# Patient Record
Sex: MALE | Race: WHITE | URBAN - METROPOLITAN AREA
[De-identification: names, ages, dates, MRNs, and addresses within clinical notes are randomized per-mention and may not be internally consistent; named-entity substitution may affect disease eponyms.]

---

## 2017-02-03 ENCOUNTER — OFFICE VISIT (OUTPATIENT)
Dept: ORTHOPEDIC SURGERY | Age: 57
End: 2017-02-03

## 2017-02-03 ENCOUNTER — OFFICE VISIT (OUTPATIENT)
Dept: FAMILY MEDICINE CLINIC | Age: 57
End: 2017-02-03

## 2017-02-03 ENCOUNTER — TELEPHONE (OUTPATIENT)
Dept: ORTHOPEDIC SURGERY | Age: 57
End: 2017-02-03

## 2017-02-03 VITALS
WEIGHT: 225 LBS | BODY MASS INDEX: 29.82 KG/M2 | TEMPERATURE: 98 F | DIASTOLIC BLOOD PRESSURE: 80 MMHG | SYSTOLIC BLOOD PRESSURE: 120 MMHG | OXYGEN SATURATION: 98 % | HEART RATE: 71 BPM | HEIGHT: 73 IN

## 2017-02-03 VITALS
WEIGHT: 218.26 LBS | HEART RATE: 63 BPM | HEIGHT: 73 IN | DIASTOLIC BLOOD PRESSURE: 85 MMHG | BODY MASS INDEX: 28.93 KG/M2 | SYSTOLIC BLOOD PRESSURE: 136 MMHG

## 2017-02-03 DIAGNOSIS — S46.212A RUPTURE OF DISTAL BICEPS TENDON, LEFT, INITIAL ENCOUNTER: Primary | ICD-10-CM

## 2017-02-03 DIAGNOSIS — Z01.818 PREOP EXAMINATION: Primary | ICD-10-CM

## 2017-02-03 DIAGNOSIS — S46.212S RUPTURE OF DISTAL BICEPS TENDON, LEFT, SEQUELA: ICD-10-CM

## 2017-02-03 PROCEDURE — 99213 OFFICE O/P EST LOW 20 MIN: CPT | Performed by: ORTHOPAEDIC SURGERY

## 2017-02-03 PROCEDURE — 99243 OFF/OP CNSLTJ NEW/EST LOW 30: CPT | Performed by: NURSE PRACTITIONER

## 2017-02-03 RX ORDER — NAPROXEN 500 MG/1
500 TABLET ORAL 2 TIMES DAILY WITH MEALS
Qty: 60 TABLET | Refills: 2 | Status: SHIPPED | OUTPATIENT
Start: 2017-02-03 | End: 2017-02-03

## 2017-02-03 RX ORDER — HYDROCODONE BITARTRATE AND ACETAMINOPHEN 5; 325 MG/1; MG/1
1-2 TABLET ORAL EVERY 4 HOURS PRN
Qty: 40 TABLET | Refills: 0 | Status: SHIPPED | OUTPATIENT
Start: 2017-02-03 | End: 2017-02-03

## 2017-02-03 ASSESSMENT — ENCOUNTER SYMPTOMS
APNEA: 0
SINUS PRESSURE: 0
NAUSEA: 0
ABDOMINAL PAIN: 0
EYE REDNESS: 0
COUGH: 0
BACK PAIN: 0
EYE PAIN: 0
CONSTIPATION: 0
CHEST TIGHTNESS: 0
SHORTNESS OF BREATH: 0
DIARRHEA: 0
RHINORRHEA: 0
VOMITING: 0
ABDOMINAL DISTENTION: 0
BLOOD IN STOOL: 0
WHEEZING: 0

## 2017-02-03 ASSESSMENT — PATIENT HEALTH QUESTIONNAIRE - PHQ9
SUM OF ALL RESPONSES TO PHQ QUESTIONS 1-9: 0
1. LITTLE INTEREST OR PLEASURE IN DOING THINGS: 0
SUM OF ALL RESPONSES TO PHQ9 QUESTIONS 1 & 2: 0
2. FEELING DOWN, DEPRESSED OR HOPELESS: 0

## 2017-02-09 ENCOUNTER — HOSPITAL ENCOUNTER (OUTPATIENT)
Dept: SURGERY | Age: 57
Discharge: HOME | End: 2017-02-09
Attending: ORTHOPAEDIC SURGERY | Admitting: ORTHOPAEDIC SURGERY

## 2017-02-09 VITALS
WEIGHT: 220 LBS | OXYGEN SATURATION: 95 % | HEIGHT: 73 IN | HEART RATE: 79 BPM | TEMPERATURE: 97.8 F | BODY MASS INDEX: 29.16 KG/M2 | RESPIRATION RATE: 16 BRPM | SYSTOLIC BLOOD PRESSURE: 134 MMHG | DIASTOLIC BLOOD PRESSURE: 87 MMHG

## 2017-02-09 DIAGNOSIS — R52 PAIN: ICD-10-CM

## 2017-02-09 RX ORDER — FENTANYL CITRATE 50 UG/ML
50 INJECTION, SOLUTION INTRAMUSCULAR; INTRAVENOUS EVERY 5 MIN PRN
Status: DISCONTINUED | OUTPATIENT
Start: 2017-02-09 | End: 2017-02-10 | Stop reason: HOSPADM

## 2017-02-09 RX ORDER — HYDROMORPHONE HCL 110MG/55ML
0.25 PATIENT CONTROLLED ANALGESIA SYRINGE INTRAVENOUS EVERY 5 MIN PRN
Status: DISCONTINUED | OUTPATIENT
Start: 2017-02-09 | End: 2017-02-10 | Stop reason: HOSPADM

## 2017-02-09 RX ORDER — OXYCODONE HYDROCHLORIDE 5 MG/1
5 TABLET ORAL PRN
Status: COMPLETED | OUTPATIENT
Start: 2017-02-09 | End: 2017-02-09

## 2017-02-09 RX ORDER — LIDOCAINE HYDROCHLORIDE 10 MG/ML
0.5 INJECTION, SOLUTION EPIDURAL; INFILTRATION; INTRACAUDAL; PERINEURAL ONCE
Status: DISCONTINUED | OUTPATIENT
Start: 2017-02-09 | End: 2017-02-10 | Stop reason: HOSPADM

## 2017-02-09 RX ORDER — MEPERIDINE HYDROCHLORIDE 25 MG/ML
12.5 INJECTION INTRAMUSCULAR; INTRAVENOUS; SUBCUTANEOUS EVERY 5 MIN PRN
Status: DISCONTINUED | OUTPATIENT
Start: 2017-02-09 | End: 2017-02-10 | Stop reason: HOSPADM

## 2017-02-09 RX ORDER — CEFAZOLIN SODIUM 2 G/100ML
2 INJECTION, SOLUTION INTRAVENOUS
Status: COMPLETED | OUTPATIENT
Start: 2017-02-09 | End: 2017-02-09

## 2017-02-09 RX ORDER — PROMETHAZINE HYDROCHLORIDE 25 MG/ML
6.25 INJECTION, SOLUTION INTRAMUSCULAR; INTRAVENOUS PRN
Status: DISCONTINUED | OUTPATIENT
Start: 2017-02-09 | End: 2017-02-10 | Stop reason: HOSPADM

## 2017-02-09 RX ORDER — DIPHENHYDRAMINE HYDROCHLORIDE 50 MG/ML
12.5 INJECTION INTRAMUSCULAR; INTRAVENOUS
Status: ACTIVE | OUTPATIENT
Start: 2017-02-09 | End: 2017-02-09

## 2017-02-09 RX ORDER — SODIUM CHLORIDE, SODIUM LACTATE, POTASSIUM CHLORIDE, CALCIUM CHLORIDE 600; 310; 30; 20 MG/100ML; MG/100ML; MG/100ML; MG/100ML
INJECTION, SOLUTION INTRAVENOUS CONTINUOUS
Status: DISCONTINUED | OUTPATIENT
Start: 2017-02-09 | End: 2017-02-10 | Stop reason: HOSPADM

## 2017-02-09 RX ORDER — OXYCODONE HYDROCHLORIDE 5 MG/1
10 TABLET ORAL PRN
Status: COMPLETED | OUTPATIENT
Start: 2017-02-09 | End: 2017-02-09

## 2017-02-09 RX ORDER — LABETALOL HYDROCHLORIDE 5 MG/ML
5 INJECTION, SOLUTION INTRAVENOUS EVERY 10 MIN PRN
Status: DISCONTINUED | OUTPATIENT
Start: 2017-02-09 | End: 2017-02-10 | Stop reason: HOSPADM

## 2017-02-09 RX ORDER — HYDROMORPHONE HCL 110MG/55ML
0.5 PATIENT CONTROLLED ANALGESIA SYRINGE INTRAVENOUS EVERY 5 MIN PRN
Status: DISCONTINUED | OUTPATIENT
Start: 2017-02-09 | End: 2017-02-10 | Stop reason: HOSPADM

## 2017-02-09 RX ADMIN — SODIUM CHLORIDE, SODIUM LACTATE, POTASSIUM CHLORIDE, CALCIUM CHLORIDE: 600; 310; 30; 20 INJECTION, SOLUTION INTRAVENOUS at 12:01

## 2017-02-09 RX ADMIN — OXYCODONE HYDROCHLORIDE 5 MG: 5 TABLET ORAL at 16:03

## 2017-02-09 RX ADMIN — CEFAZOLIN SODIUM 2 G: 2 INJECTION, SOLUTION INTRAVENOUS at 13:44

## 2017-02-09 ASSESSMENT — PAIN DESCRIPTION - DESCRIPTORS: DESCRIPTORS: ACHING

## 2017-02-09 ASSESSMENT — PAIN DESCRIPTION - PAIN TYPE: TYPE: SURGICAL PAIN

## 2017-02-09 ASSESSMENT — PAIN - FUNCTIONAL ASSESSMENT: PAIN_FUNCTIONAL_ASSESSMENT: 0-10

## 2017-02-09 ASSESSMENT — PAIN SCALES - GENERAL
PAINLEVEL_OUTOF10: 2
PAINLEVEL_OUTOF10: 3

## 2017-02-10 ENCOUNTER — HOSPITAL ENCOUNTER (OUTPATIENT)
Dept: PHYSICAL THERAPY | Age: 57
Discharge: HOME | End: 2017-02-28
Admitting: ORTHOPAEDIC SURGERY

## 2017-02-14 ENCOUNTER — HOSPITAL ENCOUNTER (OUTPATIENT)
Dept: PHYSICAL THERAPY | Age: 57
Discharge: HOME | End: 2017-02-14
Admitting: ORTHOPAEDIC SURGERY

## 2017-02-17 ENCOUNTER — HOSPITAL ENCOUNTER (OUTPATIENT)
Dept: PHYSICAL THERAPY | Age: 57
Discharge: HOME | End: 2017-02-17
Admitting: ORTHOPAEDIC SURGERY

## 2017-03-07 ENCOUNTER — HOSPITAL ENCOUNTER (OUTPATIENT)
Dept: PHYSICAL THERAPY | Age: 57
Discharge: HOME | End: 2017-03-07
Admitting: ORTHOPAEDIC SURGERY

## 2017-03-14 ENCOUNTER — HOSPITAL ENCOUNTER (OUTPATIENT)
Dept: PHYSICAL THERAPY | Age: 57
Discharge: HOME | End: 2017-03-14
Admitting: ORTHOPAEDIC SURGERY

## 2017-05-05 ENCOUNTER — OFFICE VISIT (OUTPATIENT)
Dept: ORTHOPEDIC SURGERY | Age: 57
End: 2017-05-05

## 2017-05-05 VITALS
DIASTOLIC BLOOD PRESSURE: 89 MMHG | BODY MASS INDEX: 29.16 KG/M2 | HEART RATE: 76 BPM | SYSTOLIC BLOOD PRESSURE: 126 MMHG | WEIGHT: 220.02 LBS | HEIGHT: 73 IN

## 2017-05-05 DIAGNOSIS — S46.212A RUPTURE OF DISTAL BICEPS TENDON, LEFT, INITIAL ENCOUNTER: Primary | ICD-10-CM

## 2017-05-05 DIAGNOSIS — M25.522 LEFT ELBOW PAIN: ICD-10-CM

## 2017-05-05 PROCEDURE — 99024 POSTOP FOLLOW-UP VISIT: CPT | Performed by: ORTHOPAEDIC SURGERY

## 2017-05-26 ENCOUNTER — OFFICE VISIT (OUTPATIENT)
Dept: FAMILY MEDICINE CLINIC | Age: 57
End: 2017-05-26

## 2017-05-26 VITALS
BODY MASS INDEX: 29.95 KG/M2 | WEIGHT: 226 LBS | HEART RATE: 77 BPM | DIASTOLIC BLOOD PRESSURE: 82 MMHG | OXYGEN SATURATION: 97 % | SYSTOLIC BLOOD PRESSURE: 118 MMHG | HEIGHT: 73 IN

## 2017-05-26 DIAGNOSIS — R07.9 CHEST PAIN, UNSPECIFIED TYPE: ICD-10-CM

## 2017-05-26 DIAGNOSIS — Z00.00 WELL ADULT EXAM: Primary | ICD-10-CM

## 2017-05-26 LAB
A/G RATIO: 1.6 (ref 1.1–2.2)
ALBUMIN SERPL-MCNC: 4.4 G/DL (ref 3.4–5)
ALP BLD-CCNC: 48 U/L (ref 40–129)
ALT SERPL-CCNC: 23 U/L (ref 10–40)
ANION GAP SERPL CALCULATED.3IONS-SCNC: 17 MMOL/L (ref 3–16)
AST SERPL-CCNC: 17 U/L (ref 15–37)
BILIRUB SERPL-MCNC: 0.7 MG/DL (ref 0–1)
BUN BLDV-MCNC: 13 MG/DL (ref 7–20)
C-REACTIVE PROTEIN: 0.4 MG/L (ref 0–5.1)
CALCIUM SERPL-MCNC: 9.2 MG/DL (ref 8.3–10.6)
CHLORIDE BLD-SCNC: 105 MMOL/L (ref 99–110)
CHOLESTEROL, TOTAL: 167 MG/DL (ref 0–199)
CO2: 23 MMOL/L (ref 21–32)
CREAT SERPL-MCNC: 0.9 MG/DL (ref 0.9–1.3)
GFR AFRICAN AMERICAN: >60
GFR NON-AFRICAN AMERICAN: >60
GLOBULIN: 2.7 G/DL
GLUCOSE BLD-MCNC: 70 MG/DL (ref 70–99)
HDLC SERPL-MCNC: 33 MG/DL (ref 40–60)
HEPATITIS C ANTIBODY INTERPRETATION: NORMAL
LDL CHOLESTEROL CALCULATED: 110 MG/DL
POTASSIUM SERPL-SCNC: 4.8 MMOL/L (ref 3.5–5.1)
PROSTATE SPECIFIC ANTIGEN: 3.26 NG/ML (ref 0–4)
SODIUM BLD-SCNC: 145 MMOL/L (ref 136–145)
TOTAL PROTEIN: 7.1 G/DL (ref 6.4–8.2)
TRIGL SERPL-MCNC: 119 MG/DL (ref 0–150)
TSH REFLEX: 1.92 UIU/ML (ref 0.27–4.2)
VLDLC SERPL CALC-MCNC: 24 MG/DL

## 2017-05-26 PROCEDURE — 99396 PREV VISIT EST AGE 40-64: CPT | Performed by: FAMILY MEDICINE

## 2017-05-26 ASSESSMENT — ENCOUNTER SYMPTOMS
RESPIRATORY NEGATIVE: 1
GASTROINTESTINAL NEGATIVE: 1

## 2017-05-28 LAB — HIV-1 AND HIV-2 ANTIBODIES: NEGATIVE

## 2017-05-31 ENCOUNTER — TELEPHONE (OUTPATIENT)
Dept: FAMILY MEDICINE CLINIC | Age: 57
End: 2017-05-31

## 2017-06-06 ENCOUNTER — HOSPITAL ENCOUNTER (OUTPATIENT)
Dept: NON INVASIVE DIAGNOSTICS | Age: 57
Discharge: HOME | End: 2017-06-06
Attending: FAMILY MEDICINE | Admitting: FAMILY MEDICINE

## 2017-06-06 DIAGNOSIS — R07.9 CHEST PAIN, UNSPECIFIED TYPE: ICD-10-CM

## 2017-06-06 LAB
LV EF: 61 %
LVEF MODALITY: NORMAL

## 2017-06-23 DIAGNOSIS — R94.39 ABNORMAL STRESS TEST: Primary | ICD-10-CM

## 2017-08-15 ENCOUNTER — OFFICE VISIT (OUTPATIENT)
Dept: CARDIOLOGY CLINIC | Age: 57
End: 2017-08-15

## 2017-08-15 VITALS
SYSTOLIC BLOOD PRESSURE: 118 MMHG | HEART RATE: 72 BPM | DIASTOLIC BLOOD PRESSURE: 80 MMHG | BODY MASS INDEX: 30.7 KG/M2 | WEIGHT: 231.6 LBS

## 2017-08-15 DIAGNOSIS — I47.9 PAROXYSMAL TACHYCARDIA (HCC): ICD-10-CM

## 2017-08-15 PROCEDURE — 99215 OFFICE O/P EST HI 40 MIN: CPT | Performed by: INTERNAL MEDICINE

## 2017-08-25 PROCEDURE — 93228 REMOTE 30 DAY ECG REV/REPORT: CPT | Performed by: INTERNAL MEDICINE

## 2017-08-28 ENCOUNTER — TELEPHONE (OUTPATIENT)
Dept: CARDIOLOGY CLINIC | Age: 57
End: 2017-08-28

## 2017-08-29 ENCOUNTER — OFFICE VISIT (OUTPATIENT)
Dept: CARDIOLOGY CLINIC | Age: 57
End: 2017-08-29

## 2017-08-29 VITALS
DIASTOLIC BLOOD PRESSURE: 78 MMHG | SYSTOLIC BLOOD PRESSURE: 126 MMHG | WEIGHT: 229 LBS | HEART RATE: 76 BPM | BODY MASS INDEX: 30.35 KG/M2

## 2017-08-29 DIAGNOSIS — I47.9 PAROXYSMAL TACHYCARDIA (HCC): Primary | ICD-10-CM

## 2017-08-29 PROCEDURE — 99214 OFFICE O/P EST MOD 30 MIN: CPT | Performed by: NURSE PRACTITIONER

## 2017-11-03 ENCOUNTER — NURSE ONLY (OUTPATIENT)
Dept: FAMILY MEDICINE CLINIC | Age: 57
End: 2017-11-03

## 2017-11-03 DIAGNOSIS — Z23 NEED FOR PNEUMOCOCCAL VACCINE: Primary | ICD-10-CM

## 2017-11-03 PROCEDURE — 90670 PCV13 VACCINE IM: CPT | Performed by: FAMILY MEDICINE

## 2017-11-03 PROCEDURE — 90471 IMMUNIZATION ADMIN: CPT | Performed by: FAMILY MEDICINE

## 2018-05-30 ENCOUNTER — OFFICE VISIT (OUTPATIENT)
Dept: FAMILY MEDICINE CLINIC | Age: 58
End: 2018-05-30

## 2018-05-30 VITALS
SYSTOLIC BLOOD PRESSURE: 122 MMHG | WEIGHT: 228.6 LBS | HEART RATE: 66 BPM | BODY MASS INDEX: 30.3 KG/M2 | HEIGHT: 73 IN | OXYGEN SATURATION: 98 % | DIASTOLIC BLOOD PRESSURE: 88 MMHG

## 2018-05-30 DIAGNOSIS — Z00.00 WELL ADULT EXAM: Primary | ICD-10-CM

## 2018-05-30 DIAGNOSIS — Z00.00 WELL ADULT EXAM: ICD-10-CM

## 2018-05-30 LAB
A/G RATIO: 2 (ref 1.1–2.2)
ALBUMIN SERPL-MCNC: 4.6 G/DL (ref 3.4–5)
ALP BLD-CCNC: 43 U/L (ref 40–129)
ALT SERPL-CCNC: 20 U/L (ref 10–40)
ANION GAP SERPL CALCULATED.3IONS-SCNC: 18 MMOL/L (ref 3–16)
AST SERPL-CCNC: 15 U/L (ref 15–37)
BILIRUB SERPL-MCNC: 0.8 MG/DL (ref 0–1)
BUN BLDV-MCNC: 17 MG/DL (ref 7–20)
C-REACTIVE PROTEIN, HIGH SENSITIVITY: 0.62 MG/L (ref 0.16–3)
CALCIUM SERPL-MCNC: 9.5 MG/DL (ref 8.3–10.6)
CHLORIDE BLD-SCNC: 102 MMOL/L (ref 99–110)
CHOLESTEROL, TOTAL: 191 MG/DL (ref 0–199)
CO2: 23 MMOL/L (ref 21–32)
CREAT SERPL-MCNC: 1 MG/DL (ref 0.9–1.3)
GFR AFRICAN AMERICAN: >60
GFR NON-AFRICAN AMERICAN: >60
GLOBULIN: 2.3 G/DL
GLUCOSE BLD-MCNC: 97 MG/DL (ref 70–99)
HDLC SERPL-MCNC: 37 MG/DL (ref 40–60)
LDL CHOLESTEROL CALCULATED: 127 MG/DL
POTASSIUM SERPL-SCNC: 4.7 MMOL/L (ref 3.5–5.1)
PROSTATE SPECIFIC ANTIGEN: 3.07 NG/ML (ref 0–4)
SODIUM BLD-SCNC: 143 MMOL/L (ref 136–145)
TOTAL PROTEIN: 6.9 G/DL (ref 6.4–8.2)
TRIGL SERPL-MCNC: 133 MG/DL (ref 0–150)
VLDLC SERPL CALC-MCNC: 27 MG/DL

## 2018-05-30 PROCEDURE — 99396 PREV VISIT EST AGE 40-64: CPT | Performed by: FAMILY MEDICINE

## 2018-05-30 ASSESSMENT — PATIENT HEALTH QUESTIONNAIRE - PHQ9
SUM OF ALL RESPONSES TO PHQ9 QUESTIONS 1 & 2: 0
2. FEELING DOWN, DEPRESSED OR HOPELESS: 0
SUM OF ALL RESPONSES TO PHQ QUESTIONS 1-9: 0
1. LITTLE INTEREST OR PLEASURE IN DOING THINGS: 0

## 2018-05-30 ASSESSMENT — ENCOUNTER SYMPTOMS
CONSTIPATION: 0
DIARRHEA: 0

## 2018-06-04 RX ORDER — SCOLOPAMINE TRANSDERMAL SYSTEM 1 MG/1
1 PATCH, EXTENDED RELEASE TRANSDERMAL
Qty: 4 PATCH | Refills: 0 | Status: SHIPPED | OUTPATIENT
Start: 2018-06-04 | End: 2019-06-04

## 2019-06-21 ENCOUNTER — OFFICE VISIT (OUTPATIENT)
Dept: FAMILY MEDICINE CLINIC | Age: 59
End: 2019-06-21
Payer: COMMERCIAL

## 2019-06-21 VITALS
RESPIRATION RATE: 12 BRPM | OXYGEN SATURATION: 98 % | HEART RATE: 76 BPM | SYSTOLIC BLOOD PRESSURE: 124 MMHG | BODY MASS INDEX: 30.96 KG/M2 | HEIGHT: 72 IN | DIASTOLIC BLOOD PRESSURE: 84 MMHG | WEIGHT: 228.6 LBS

## 2019-06-21 DIAGNOSIS — Z00.00 WELL ADULT EXAM: Primary | ICD-10-CM

## 2019-06-21 PROCEDURE — 99396 PREV VISIT EST AGE 40-64: CPT | Performed by: FAMILY MEDICINE

## 2019-06-21 ASSESSMENT — PATIENT HEALTH QUESTIONNAIRE - PHQ9
SUM OF ALL RESPONSES TO PHQ9 QUESTIONS 1 & 2: 0
SUM OF ALL RESPONSES TO PHQ QUESTIONS 1-9: 0
2. FEELING DOWN, DEPRESSED OR HOPELESS: 0
SUM OF ALL RESPONSES TO PHQ QUESTIONS 1-9: 0
1. LITTLE INTEREST OR PLEASURE IN DOING THINGS: 0

## 2019-06-21 ASSESSMENT — ENCOUNTER SYMPTOMS
DIARRHEA: 0
CONSTIPATION: 0
RESPIRATORY NEGATIVE: 1
BLOOD IN STOOL: 0

## 2019-06-21 NOTE — PROGRESS NOTES
Subjective:      Patient ID: Luz Palm is a 60 y.o. male. HPI   Pt is a of 60 y.o. male comes in today with   Chief Complaint   Patient presents with    Annual Exam     not fasting      Here for annual.  Dealing okay with lower back pain. Past Medical History:Reviewed  Medications:Reviewed. Allergies   Allergen Reactions    Kiwi Extract Anaphylaxis    Other Anaphylaxis     BEER    Wine [Alcohol] Anaphylaxis and Itching     Beverages      Social hx:Reviewed. Social History     Tobacco Use   Smoking Status Never Smoker   Smokeless Tobacco Never Used       Review of Systems   Constitutional: Negative. Respiratory: Negative. Cardiovascular: Negative. Gastrointestinal: Negative for blood in stool, constipation and diarrhea. Genitourinary: Negative. Hematological: Does not bruise/bleed easily. Objective:   Physical Exam   Constitutional: He is oriented to person, place, and time. He appears well-developed and well-nourished. HENT:   Head: Normocephalic and atraumatic. Right Ear: Tympanic membrane, external ear and ear canal normal.   Left Ear: Tympanic membrane, external ear and ear canal normal.   Mouth/Throat: Oropharynx is clear and moist.   Eyes: Conjunctivae are normal. No scleral icterus. Neck: Normal range of motion. Neck supple. No thyromegaly present. Cardiovascular: Normal rate, regular rhythm and normal heart sounds. No murmur heard. Pulmonary/Chest: Effort normal and breath sounds normal. He has no wheezes. He has no rales. Abdominal: Soft. Bowel sounds are normal. He exhibits no distension. There is no splenomegaly or hepatomegaly. There is no tenderness. Musculoskeletal: He exhibits no edema. Neurological: He is alert and oriented to person, place, and time. He has normal reflexes. No cranial nerve deficit. Skin: Skin is warm and dry. Psychiatric: He has a normal mood and affect.  His behavior is normal. Judgment and thought content normal. Assessment:       Diagnosis Orders   1. Well adult exam  Lipid Panel    Comprehensive Metabolic Panel    Psa screening          Plan:      Reviewed diet and exercise. Fasting bloodwork ordered.         Pham Conrad MD

## 2019-06-24 DIAGNOSIS — Z00.00 WELL ADULT EXAM: ICD-10-CM

## 2019-06-24 LAB
A/G RATIO: 2 (ref 1.1–2.2)
ALBUMIN SERPL-MCNC: 4.4 G/DL (ref 3.4–5)
ALP BLD-CCNC: 48 U/L (ref 40–129)
ALT SERPL-CCNC: 20 U/L (ref 10–40)
ANION GAP SERPL CALCULATED.3IONS-SCNC: 13 MMOL/L (ref 3–16)
AST SERPL-CCNC: 15 U/L (ref 15–37)
BILIRUB SERPL-MCNC: 0.6 MG/DL (ref 0–1)
BUN BLDV-MCNC: 17 MG/DL (ref 7–20)
C-REACTIVE PROTEIN, HIGH SENSITIVITY: 0.93 MG/L (ref 0.16–3)
CALCIUM SERPL-MCNC: 9.4 MG/DL (ref 8.3–10.6)
CHLORIDE BLD-SCNC: 105 MMOL/L (ref 99–110)
CHOLESTEROL, TOTAL: 185 MG/DL (ref 0–199)
CO2: 23 MMOL/L (ref 21–32)
CREAT SERPL-MCNC: 0.9 MG/DL (ref 0.8–1.3)
GFR AFRICAN AMERICAN: >60
GFR NON-AFRICAN AMERICAN: >60
GLOBULIN: 2.2 G/DL
GLUCOSE BLD-MCNC: 97 MG/DL (ref 70–99)
HDLC SERPL-MCNC: 34 MG/DL (ref 40–60)
LDL CHOLESTEROL CALCULATED: 125 MG/DL
POTASSIUM SERPL-SCNC: 4.2 MMOL/L (ref 3.5–5.1)
PROSTATE SPECIFIC ANTIGEN: 3.95 NG/ML (ref 0–4)
SODIUM BLD-SCNC: 141 MMOL/L (ref 136–145)
TOTAL PROTEIN: 6.6 G/DL (ref 6.4–8.2)
TRIGL SERPL-MCNC: 129 MG/DL (ref 0–150)
VLDLC SERPL CALC-MCNC: 26 MG/DL

## 2020-01-15 ENCOUNTER — OFFICE VISIT (OUTPATIENT)
Dept: FAMILY MEDICINE CLINIC | Age: 60
End: 2020-01-15
Payer: COMMERCIAL

## 2020-01-15 VITALS
SYSTOLIC BLOOD PRESSURE: 120 MMHG | HEART RATE: 68 BPM | DIASTOLIC BLOOD PRESSURE: 88 MMHG | OXYGEN SATURATION: 97 % | BODY MASS INDEX: 30.75 KG/M2 | HEIGHT: 72 IN | WEIGHT: 227 LBS

## 2020-01-15 PROCEDURE — 1036F TOBACCO NON-USER: CPT | Performed by: FAMILY MEDICINE

## 2020-01-15 PROCEDURE — 99214 OFFICE O/P EST MOD 30 MIN: CPT | Performed by: FAMILY MEDICINE

## 2020-01-15 PROCEDURE — G8417 CALC BMI ABV UP PARAM F/U: HCPCS | Performed by: FAMILY MEDICINE

## 2020-01-15 PROCEDURE — G8484 FLU IMMUNIZE NO ADMIN: HCPCS | Performed by: FAMILY MEDICINE

## 2020-01-15 PROCEDURE — G8427 DOCREV CUR MEDS BY ELIG CLIN: HCPCS | Performed by: FAMILY MEDICINE

## 2020-01-15 PROCEDURE — 3017F COLORECTAL CA SCREEN DOC REV: CPT | Performed by: FAMILY MEDICINE

## 2020-01-15 ASSESSMENT — PATIENT HEALTH QUESTIONNAIRE - PHQ9
SUM OF ALL RESPONSES TO PHQ QUESTIONS 1-9: 0
1. LITTLE INTEREST OR PLEASURE IN DOING THINGS: 0
SUM OF ALL RESPONSES TO PHQ9 QUESTIONS 1 & 2: 0
2. FEELING DOWN, DEPRESSED OR HOPELESS: 0
SUM OF ALL RESPONSES TO PHQ QUESTIONS 1-9: 0

## 2020-01-15 NOTE — PROGRESS NOTES
Subjective:      Patient ID: Sung Phalen is a 60 y.o. male. HPI   Pt is a of 60 y.o. male comes in today with   Chief Complaint   Patient presents with    Abdominal Pain     Patient complains of lower left abdominal pain that has been ongoing, has progressively gotten worse. Complains of nausea in the mornings, denies vomiting and diarrhea. Patient has been following vegeterian diet for the past 2 months. Vegetarian diet for the past 2 months. Lower left abd pain worse recently  Better after bowel movement. Started to wake him up at night. Some nausea in the am.  No h/o constipation. No change in color or blood. Past Medical History:Reviewed  Medications:Reviewed. Allergies   Allergen Reactions    Kiwi Extract Anaphylaxis    Other Anaphylaxis     BEER    Wine [Alcohol] Anaphylaxis and Itching     Beverages      Social hx:Reviewed. Social History     Tobacco Use   Smoking Status Never Smoker   Smokeless Tobacco Never Used        Vitals:    01/15/20 0853   BP: 120/88   Site: Right Upper Arm   Position: Sitting   Cuff Size: Medium Adult   Pulse: 68   SpO2: 97%   Weight: 227 lb (103 kg)   Height: 6' (1.829 m)      Review of Systems   Constitutional: Negative. Genitourinary: Negative. Objective:   Physical Exam  Constitutional:       Appearance: Normal appearance. Abdominal:      Tenderness: There is tenderness in the left lower quadrant. There is no guarding or rebound. Neurological:      Mental Status: He is alert. Assessment:       Diagnosis Orders   1. LLQ pain  CT ABDOMEN PELVIS W IV CONTRAST Additional Contrast? Oral   2. Nausea            Plan:      Stat ct to evaluate. Hold on antibiotics until then  Call or return to clinic prn if these symptoms worsen or fail to improve as anticipated.     If normal start PPI for nausea; egd if not improving        Rachael Dent MD

## 2020-01-16 ENCOUNTER — TELEPHONE (OUTPATIENT)
Dept: FAMILY MEDICINE CLINIC | Age: 60
End: 2020-01-16

## 2020-01-16 NOTE — TELEPHONE ENCOUNTER
Phone: [phone]  Contacts: JAIME Torres Manager    Report has not been signed/read. Patient informed. Patient states that he was told at the appointment that there was no emergent issue but to contact us for the results yesterday afternoon. Apologized and informed him that I requested the results ASAP and we would let him know as soon as we receive it. Once I hung up with patient received the report via fax. Scanned. Please advise. Thanks.

## 2020-01-17 ENCOUNTER — TELEPHONE (OUTPATIENT)
Dept: FAMILY MEDICINE CLINIC | Age: 60
End: 2020-01-17

## 2020-01-17 NOTE — TELEPHONE ENCOUNTER
Pt called and said that he is going to see an oncologist surgeon just to make sure that there is nothing that he should do about the lesion since his father  of pancreatic cancer at age 27  [de-identified]

## 2020-10-26 ENCOUNTER — NURSE ONLY (OUTPATIENT)
Dept: PRIMARY CARE CLINIC | Age: 60
End: 2020-10-26
Payer: COMMERCIAL

## 2020-10-26 PROCEDURE — 90471 IMMUNIZATION ADMIN: CPT | Performed by: FAMILY MEDICINE

## 2020-10-26 PROCEDURE — 90686 IIV4 VACC NO PRSV 0.5 ML IM: CPT | Performed by: FAMILY MEDICINE

## 2020-10-27 ENCOUNTER — TELEPHONE (OUTPATIENT)
Dept: FAMILY MEDICINE CLINIC | Age: 60
End: 2020-10-27

## 2020-10-27 NOTE — TELEPHONE ENCOUNTER
Patient would like to get fasting lab work complete and then schedule a physical. Last physical w/labs was 06/2019.

## 2020-11-16 DIAGNOSIS — R10.32 LLQ PAIN: ICD-10-CM

## 2020-11-16 DIAGNOSIS — Z12.5 SCREENING PSA (PROSTATE SPECIFIC ANTIGEN): ICD-10-CM

## 2020-11-16 DIAGNOSIS — Z00.00 WELL ADULT EXAM: ICD-10-CM

## 2020-11-16 LAB
A/G RATIO: 2 (ref 1.1–2.2)
ALBUMIN SERPL-MCNC: 4.7 G/DL (ref 3.4–5)
ALP BLD-CCNC: 50 U/L (ref 40–129)
ALT SERPL-CCNC: 22 U/L (ref 10–40)
ANION GAP SERPL CALCULATED.3IONS-SCNC: 8 MMOL/L (ref 3–16)
AST SERPL-CCNC: 16 U/L (ref 15–37)
BASOPHILS ABSOLUTE: 0 K/UL (ref 0–0.2)
BASOPHILS RELATIVE PERCENT: 0.6 %
BILIRUB SERPL-MCNC: 0.7 MG/DL (ref 0–1)
BUN BLDV-MCNC: 12 MG/DL (ref 7–20)
CALCIUM SERPL-MCNC: 9.9 MG/DL (ref 8.3–10.6)
CHLORIDE BLD-SCNC: 103 MMOL/L (ref 99–110)
CHOLESTEROL, TOTAL: 176 MG/DL (ref 0–199)
CO2: 27 MMOL/L (ref 21–32)
CREAT SERPL-MCNC: 0.9 MG/DL (ref 0.8–1.3)
EOSINOPHILS ABSOLUTE: 0.2 K/UL (ref 0–0.6)
EOSINOPHILS RELATIVE PERCENT: 3.6 %
GFR AFRICAN AMERICAN: >60
GFR NON-AFRICAN AMERICAN: >60
GLOBULIN: 2.4 G/DL
GLUCOSE BLD-MCNC: 100 MG/DL (ref 70–99)
HCT VFR BLD CALC: 50 % (ref 40.5–52.5)
HDLC SERPL-MCNC: 37 MG/DL (ref 40–60)
HEMOGLOBIN: 17.2 G/DL (ref 13.5–17.5)
LDL CHOLESTEROL CALCULATED: 114 MG/DL
LYMPHOCYTES ABSOLUTE: 1.4 K/UL (ref 1–5.1)
LYMPHOCYTES RELATIVE PERCENT: 22.1 %
MCH RBC QN AUTO: 29.9 PG (ref 26–34)
MCHC RBC AUTO-ENTMCNC: 34.4 G/DL (ref 31–36)
MCV RBC AUTO: 86.8 FL (ref 80–100)
MONOCYTES ABSOLUTE: 0.5 K/UL (ref 0–1.3)
MONOCYTES RELATIVE PERCENT: 8.8 %
NEUTROPHILS ABSOLUTE: 4.1 K/UL (ref 1.7–7.7)
NEUTROPHILS RELATIVE PERCENT: 64.9 %
PDW BLD-RTO: 13.5 % (ref 12.4–15.4)
PLATELET # BLD: 164 K/UL (ref 135–450)
PMV BLD AUTO: 9.6 FL (ref 5–10.5)
POTASSIUM SERPL-SCNC: 4.5 MMOL/L (ref 3.5–5.1)
PROSTATE SPECIFIC ANTIGEN: 8.17 NG/ML (ref 0–4)
RBC # BLD: 5.75 M/UL (ref 4.2–5.9)
SODIUM BLD-SCNC: 138 MMOL/L (ref 136–145)
TOTAL PROTEIN: 7.1 G/DL (ref 6.4–8.2)
TRIGL SERPL-MCNC: 125 MG/DL (ref 0–150)
VLDLC SERPL CALC-MCNC: 25 MG/DL
WBC # BLD: 6.3 K/UL (ref 4–11)

## 2020-11-20 ENCOUNTER — VIRTUAL VISIT (OUTPATIENT)
Dept: FAMILY MEDICINE CLINIC | Age: 60
End: 2020-11-20
Payer: COMMERCIAL

## 2020-11-20 PROCEDURE — G8417 CALC BMI ABV UP PARAM F/U: HCPCS | Performed by: FAMILY MEDICINE

## 2020-11-20 PROCEDURE — 99213 OFFICE O/P EST LOW 20 MIN: CPT | Performed by: FAMILY MEDICINE

## 2020-11-20 PROCEDURE — 3017F COLORECTAL CA SCREEN DOC REV: CPT | Performed by: FAMILY MEDICINE

## 2020-11-20 PROCEDURE — G8427 DOCREV CUR MEDS BY ELIG CLIN: HCPCS | Performed by: FAMILY MEDICINE

## 2020-11-20 PROCEDURE — 1036F TOBACCO NON-USER: CPT | Performed by: FAMILY MEDICINE

## 2020-11-20 PROCEDURE — G8482 FLU IMMUNIZE ORDER/ADMIN: HCPCS | Performed by: FAMILY MEDICINE

## 2020-11-20 NOTE — PROGRESS NOTES
2020    TELEHEALTH EVALUATION -- Audio[facility]Visual [facility]    HPI:    Edenilson Carvajal (:  1960) has requested an audio/video evaluation for the following concern(s):    Had bloodwork . psa up from before. Exercising vigorously regularly. No urine symptoms. Flow steady. No abd pain    Review of Systems   Constitutional: Negative. Prior to Visit Medications    Medication Sig Taking?  Authorizing Provider   Flaxseed, Linseed, (FLAX SEED OIL) 1000 MG CAPS Take 1,000 mg by mouth daily Yes Historical Provider, MD   Omega-3 Fatty Acids (FISH OIL) 1000 MG CAPS Take 1,000 mg by mouth daily Yes Historical Provider, MD   Cholecalciferol (VITAMIN D PO) Take 5,000 Units by mouth daily  Yes Historical Provider, MD   GLUCOSAMINE CHONDROITIN COMPLX PO Take by mouth daily Yes Historical Provider, MD   Multiple Vitamins-Minerals (THERAPEUTIC MULTIVITAMIN-MINERALS) tablet Take 1 tablet by mouth daily Yes Historical Provider, MD   calcium carbonate (OSCAL) 500 MG TABS tablet Take 1,000 mg by mouth daily Yes Historical Provider, MD   magnesium gluconate (MAGONATE) 500 MG tablet Take 1,000 mg by mouth daily Yes Historical Provider, MD   Milk Thistle 1000 MG CAPS Take by mouth daily Yes Historical Provider, MD   ALPHA LIPOIC ACID PO Take by mouth daily Yes Historical Provider, MD       Social History     Tobacco Use    Smoking status: Never Smoker    Smokeless tobacco: Never Used   Substance Use Topics    Alcohol use: No    Drug use: No        Allergies   Allergen Reactions    Kiwi Extract Anaphylaxis    Other Anaphylaxis     BEER    Wine [Alcohol] Anaphylaxis and Itching     Beverages       PHYSICAL EXAMINATION:  [ INSTRUCTIONS:  \"[x]\" Indicates a positive item  \"[]\" Indicates a negative item  -- DELETE ALL ITEMS NOT EXAMINED]  Vital Signs: (As obtained by patient/caregiver or practitioner observation)    Blood pressure-  Heart rate-    Respiratory rate-    Temperature- Pulse oximetry-     Constitutional: [] Appears well-developed and well-nourished [] No apparent distress      [] Abnormal-   Mental status  [] Alert and awake  [] Oriented to person/place/time []Able to follow commands      Eyes:  EOM    []  Normal  [] Abnormal-  Sclera  []  Normal  [] Abnormal -         Discharge []  None visible  [] Abnormal -    HENT:   [] Normocephalic, atraumatic. [] Abnormal   [] Mouth/Throat: Mucous membranes are moist.     External Ears [] Normal  [] Abnormal-     Neck: [] No visualized mass     Pulmonary/Chest: [] Respiratory effort normal.  [] No visualized signs of difficulty breathing or respiratory distress        [] Abnormal-      Musculoskeletal:   [] Normal gait with no signs of ataxia         [] Normal range of motion of neck        [] Abnormal-       Neurological:        [] No Facial Asymmetry (Cranial nerve 7 motor function) (limited exam to video visit)          [] No gaze palsy        [] Abnormal-         Skin:        [] No significant exanthematous lesions or discoloration noted on facial skin         [] Abnormal-            Psychiatric:       [] Normal Affect [] No Hallucinations        [] Abnormal-     Other pertinent observable physical exam findings-     ASSESSMENT/PLAN:  1. Elevated PSA  Recheck in a week after optimizing conditions. If reassuring recheck in 6 months. otw referral to urology  - PSA, TOTAL AND FREE; Future      No follow-ups on file. Becky Voss is a 63 y.o. male being evaluated by a Virtual Visit (video visit) encounter to address concerns as mentioned above. A caregiver was present when appropriate. Due to this being a TeleHealth encounter (During [facility]- public health emergency), evaluation of the following organ systems was limited: Vitals/Constitutional/EENT/Resp/CV/GI//MS/Neuro/Skin/Heme-Lymph-Imm.   Pursuant to the emergency declaration under the [facility] waiver authority and the Coronavirus Preparedness and Response Supplemental Appropriations Act, this Virtual Visit was conducted with patient's (and/or legal guardian's) consent, to reduce the patient's risk of exposure to COVID-19 and provide necessary medical care. The patient (and/or legal guardian) has also been advised to contact this office for worsening conditions or problems, and seek emergency medical treatment and/or call 911 if deemed necessary. Patient identification was verified at the start of the visit: Yes    Total time spent on this encounter: Not billed by time    Services were provided through a video synchronous discussion virtually to substitute for in-person clinic visit. Patient and provider were located at their individual homes. --Sue Brooks MD on 11/20/2020 at 9:03 AM    An electronic signature was used to authenticate this note.

## 2020-11-30 DIAGNOSIS — R97.20 ELEVATED PSA: ICD-10-CM

## 2020-12-02 ENCOUNTER — PATIENT MESSAGE (OUTPATIENT)
Dept: FAMILY MEDICINE CLINIC | Age: 60
End: 2020-12-02

## 2020-12-02 DIAGNOSIS — R97.20 ELEVATED PSA: Primary | ICD-10-CM

## 2020-12-02 NOTE — TELEPHONE ENCOUNTER
From: Brittani Gibbs  To: Jay Nicole MD  Sent: 12/2/2020 8:59 AM EST  Subject: Test Results Question    Dr. Florence Lucas,    Can you please provide my recent PSA/Free PSA results from Monday, November 30th. Thank you.     Arin Ortez

## 2020-12-03 LAB
PROSTATE SPECIFIC ANTIGEN FREE: 0.9 UG/L
PROSTATE SPECIFIC ANTIGEN PERCENT FREE: 15.3 %
PROSTATE SPECIFIC ANTIGEN: 5.9 UG/L (ref 0–4)

## 2020-12-22 ENCOUNTER — OFFICE VISIT (OUTPATIENT)
Dept: FAMILY MEDICINE CLINIC | Age: 60
End: 2020-12-22
Payer: COMMERCIAL

## 2020-12-22 VITALS
HEIGHT: 72 IN | WEIGHT: 230 LBS | BODY MASS INDEX: 31.15 KG/M2 | OXYGEN SATURATION: 97 % | SYSTOLIC BLOOD PRESSURE: 116 MMHG | TEMPERATURE: 97 F | DIASTOLIC BLOOD PRESSURE: 80 MMHG | HEART RATE: 81 BPM

## 2020-12-22 PROCEDURE — 93000 ELECTROCARDIOGRAM COMPLETE: CPT | Performed by: NURSE PRACTITIONER

## 2020-12-22 PROCEDURE — 99213 OFFICE O/P EST LOW 20 MIN: CPT | Performed by: NURSE PRACTITIONER

## 2020-12-22 PROCEDURE — G8427 DOCREV CUR MEDS BY ELIG CLIN: HCPCS | Performed by: NURSE PRACTITIONER

## 2020-12-22 PROCEDURE — G8482 FLU IMMUNIZE ORDER/ADMIN: HCPCS | Performed by: NURSE PRACTITIONER

## 2020-12-22 PROCEDURE — G8417 CALC BMI ABV UP PARAM F/U: HCPCS | Performed by: NURSE PRACTITIONER

## 2020-12-22 SDOH — ECONOMIC STABILITY: FOOD INSECURITY: WITHIN THE PAST 12 MONTHS, THE FOOD YOU BOUGHT JUST DIDN'T LAST AND YOU DIDN'T HAVE MONEY TO GET MORE.: NEVER TRUE

## 2020-12-22 SDOH — ECONOMIC STABILITY: FOOD INSECURITY: WITHIN THE PAST 12 MONTHS, YOU WORRIED THAT YOUR FOOD WOULD RUN OUT BEFORE YOU GOT MONEY TO BUY MORE.: NEVER TRUE

## 2020-12-22 SDOH — ECONOMIC STABILITY: TRANSPORTATION INSECURITY
IN THE PAST 12 MONTHS, HAS LACK OF TRANSPORTATION KEPT YOU FROM MEETINGS, WORK, OR FROM GETTING THINGS NEEDED FOR DAILY LIVING?: NOT ASKED

## 2020-12-22 SDOH — ECONOMIC STABILITY: INCOME INSECURITY: HOW HARD IS IT FOR YOU TO PAY FOR THE VERY BASICS LIKE FOOD, HOUSING, MEDICAL CARE, AND HEATING?: NOT HARD AT ALL

## 2020-12-22 SDOH — ECONOMIC STABILITY: TRANSPORTATION INSECURITY
IN THE PAST 12 MONTHS, HAS THE LACK OF TRANSPORTATION KEPT YOU FROM MEDICAL APPOINTMENTS OR FROM GETTING MEDICATIONS?: NOT ASKED

## 2020-12-22 ASSESSMENT — ENCOUNTER SYMPTOMS
DIARRHEA: 0
SHORTNESS OF BREATH: 0
VOMITING: 0
NAUSEA: 0
ABDOMINAL PAIN: 0

## 2020-12-22 NOTE — PROGRESS NOTES
Preoperative Consultation    Shay Saucedo  YOB: 1960    This patient presents to the office today for a preoperative consultation at the request of surgeon, , who plans on performing prostate biopsy. Was seen 12/15 by Urology  Elevated PSA  At first was going to observe but found a nodule- decided to biopsy  No FH prostate cancer    No personal or FH of problems with anesthesia.   No personal or FH or problems with bleeding or clotting    Patient Active Problem List   Diagnosis    Paroxysmal tachycardia ([address]     Past Surgical History:   Procedure Laterality Date    ACHILLES TENDON SURGERY  7 years ago (2007)maxime    BICEPS TENDON REPAIR Left 02/09/2017    ACTUAL PROCEDURE: LEFT OPEN DISTAL BICEPS REPAIR    TONSILLECTOMY         Allergies   Allergen Reactions    Kiwi Extract Anaphylaxis    Other Anaphylaxis     BEER    Wine [Alcohol] Anaphylaxis and Itching     Beverages     Outpatient Medications Marked as Taking for the 12/22/20 encounter (Office Visit) with GINA Potter CNP   Medication Sig Dispense Refill    Flaxseed, Linseed, (FLAX SEED OIL) 1000 MG CAPS Take 1,000 mg by mouth daily      Omega-3 Fatty Acids (FISH OIL) 1000 MG CAPS Take 1,000 mg by mouth daily      Cholecalciferol (VITAMIN D PO) Take 5,000 Units by mouth daily       GLUCOSAMINE CHONDROITIN COMPLX PO Take by mouth daily      Multiple Vitamins-Minerals (THERAPEUTIC MULTIVITAMIN-MINERALS) tablet Take 1 tablet by mouth daily      calcium carbonate (OSCAL) 500 MG TABS tablet Take 1,000 mg by mouth daily      magnesium gluconate (MAGONATE) 500 MG tablet Take 1,000 mg by mouth daily      Milk Thistle 1000 MG CAPS Take by mouth daily      ALPHA LIPOIC ACID PO Take by mouth daily         Social History     Tobacco Use    Smoking status: Never Smoker    Smokeless tobacco: Never Used   Substance Use Topics    Alcohol use: No     Family History   Problem Relation Age of Onset    Cancer Father 35 pancreatic    High Blood Pressure Brother        Review of Systems:  Review of Systems   Constitutional: Negative for activity change, appetite change, fatigue and fever. Respiratory: Negative for shortness of breath. Cardiovascular: Negative for chest pain. Gastrointestinal: Negative for abdominal pain, diarrhea, nausea and vomiting. Genitourinary: Negative for difficulty urinating, frequency, hematuria and urgency. No split or weak stream   Neurological: Negative for dizziness and headaches. Objective:     /80   Pulse 81   Temp 97 °F (36.1 °C) (Infrared)   Ht 6' (1.829 m)   Wt 230 lb (104.3 kg)   SpO2 97%   BMI 31.19 kg/m²  Weight: 230 lb (104.3 kg)   Physical Exam  Vitals signs reviewed. Constitutional:       Appearance: Normal appearance. HENT:      Head: Normocephalic. Right Ear: Tympanic membrane, ear canal and external ear normal.      Left Ear: Tympanic membrane, ear canal and external ear normal.      Nose: Nose normal.   Cardiovascular:      Rate and Rhythm: Normal rate and regular rhythm. Pulses: Normal pulses. Heart sounds: Normal heart sounds, S1 normal and S2 normal.   Pulmonary:      Effort: Pulmonary effort is normal.      Breath sounds: Normal breath sounds and air entry. Abdominal:      Palpations: Abdomen is soft. Tenderness: There is no abdominal tenderness. Musculoskeletal:      Right lower leg: No edema. Left lower leg: No edema. Neurological:      Mental Status: He is alert. Psychiatric:         Mood and Affect: Mood normal.         Lab Review No- BMP ordered       Assessment:       1. Pre-operative general physical examination  Stable;  BMP ordered. EKG: SR old infarct (changed from previous). Cleared for planned procedure as long as cleared from cardiology. Patient v/u.  - EKG 12 Lead  - BASIC METABOLIC PANEL; Future    2. Elevated PSA  Stable;  See 1    3. Prostate nodule  Stable;  See 1    4.  Abnormal EKG Stable;  Patient to call cardiology for f/u appointment and cardiac clearance. 63 y.o. patient approved for Surgery         Plan:     1. Preoperative workup as follows: ECG, creatinine  2. Change in medication regimen before surgery: Follow directions from surgeon. 3. Patient cleared for planned procedure as long as has cardiac clearance.     Emily Price, APRN - CNP

## 2020-12-28 ENCOUNTER — OFFICE VISIT (OUTPATIENT)
Dept: CARDIOLOGY CLINIC | Age: 60
End: 2020-12-28
Payer: COMMERCIAL

## 2020-12-28 VITALS
TEMPERATURE: 97.3 F | SYSTOLIC BLOOD PRESSURE: 118 MMHG | BODY MASS INDEX: 31.65 KG/M2 | DIASTOLIC BLOOD PRESSURE: 80 MMHG | WEIGHT: 233.4 LBS | HEART RATE: 68 BPM

## 2020-12-28 PROCEDURE — G8482 FLU IMMUNIZE ORDER/ADMIN: HCPCS | Performed by: NURSE PRACTITIONER

## 2020-12-28 PROCEDURE — 1036F TOBACCO NON-USER: CPT | Performed by: NURSE PRACTITIONER

## 2020-12-28 PROCEDURE — G8417 CALC BMI ABV UP PARAM F/U: HCPCS | Performed by: NURSE PRACTITIONER

## 2020-12-28 PROCEDURE — 99214 OFFICE O/P EST MOD 30 MIN: CPT | Performed by: NURSE PRACTITIONER

## 2020-12-28 PROCEDURE — G8427 DOCREV CUR MEDS BY ELIG CLIN: HCPCS | Performed by: NURSE PRACTITIONER

## 2020-12-28 PROCEDURE — 3017F COLORECTAL CA SCREEN DOC REV: CPT | Performed by: NURSE PRACTITIONER

## 2020-12-28 NOTE — PATIENT INSTRUCTIONS
States PSA is elevated and having biopsy 11/7/21   Ok per cardiology for needed procedure   Fu  In 12 months with blood work PTV per PCP

## 2020-12-28 NOTE — PROGRESS NOTES
[facility]  Office Visit           Art Car MD,  Donovan Johnson APRN FNP CVNP       Cardiology             Erna Tony  1960 December 28, 2020    Primary Cardiologist:  Leandra Bower Hx: Mr Hermila Zaidi is here with hx PAT/ in NSR /denies palpitations/cp/SOB edema/ works out routinely / sleeps well on c/o PND or BENJAMIN      11/2020   Neg stress test 6/6/2017   EKG on 12/22/20 no ischemic changes noted  and similar to last EKGs   complaint with meds   Discussed nutrition / sodium intake / fluid intake   Recommend activity as tolerated   States PSA is elevated and having biopsy 11/7/21   Ok per cardiology for needed procedure   Fu  In 12 months      I have examined pt and reviewed notes / any lab work EKGs stress test, angiograms, & images reviewed  I  have spent >20 minutes of face to face time with the patient with more than 50% spent counseling and coordinating care this patient. Review of Systems:  Constitutional: Denies  fatigue, weakness, night sweats or fever. HEENT: Denies new visual changes, ringing in ears, nosebleeds,nasal congestion  Respiratory: Denies new or change in SOB, PND, orthopnea or cough. Cardiovascular: see HPI  GI: Denies N/V, diarrhea, constipation, abdominal pain, change in bowel habits, melena or hematochezia  : Denies urinary frequency, urgency, incontinence, hematuria or dysuria. Skin: Denies rash, hives, or cyanosis  Musculoskeletal: Denies joint or muscle aches/pain  Neurological: Denies syncope or TIA-like symptoms. Psychiatric: Denies anxiety, insomnia or depression     No past medical history on file.   Past Surgical History:   Procedure Laterality Date    ACHILLES TENDON SURGERY  7 years ago (2007)maxime    BICEPS TENDON REPAIR Left 02/09/2017    ACTUAL PROCEDURE: LEFT OPEN DISTAL BICEPS REPAIR    TONSILLECTOMY       Family History   Problem Relation Age of Onset    Cancer Father 35 Neck: Neck supple. No JVD present. Carotids without bruits. No mass and no thyromegaly present. No lymphadenopathy present. Cardiovascular: RRR, normal S1 and S2; no murmur/gallop or rub, PMI nondisplaced  Pulmonary/Chest: Effort normal.  Lungs clear to auscultation. Chest wall nontender  Abdominal: soft, nontender, nondistended. + bowel sounds; no organomegaly or bruits. Aorta normal  Extremities: No edema, cyanosis, or clubbing. Pulses are 2+ radial/carotid/dorsalis pedis bilaterally. Cap refill brisk. Neurological: No cranial nerve deficit. Psychiatric: He has a normal mood and affect.  His speech is normal and behavior is normal.     Lab Review:   Lab Results   Component Value Date    TRIG 125 11/16/2020    HDL 37 11/16/2020    LDLCALC 114 11/16/2020    LABVLDL 25 11/16/2020     Lab Results   Component Value Date     11/16/2020    K 4.5 11/16/2020     11/16/2020    CO2 27 11/16/2020    BUN 12 11/16/2020    CREATININE 0.9 11/16/2020    GLUCOSE 100 11/16/2020    CALCIUM 9.9 11/16/2020      Lab Results   Component Value Date    WBC 6.3 11/16/2020    HGB 17.2 11/16/2020    HCT 50.0 11/16/2020    MCV 86.8 11/16/2020     11/16/2020       I have reviewed any available labs, images, any stress test, LHC on this pt         Assessment:    hx PAT/ in NSR /denies palpitations/cp/SOB edema/ works out routinely / sleeps well on c/o PND or BENJAMIN        HLD / managed with fish oil / flax seed oil  per pt request    11/2020   hx PAT/ in NSR /denies palpitations/cp/SOB edema/ works out routinely / sleeps well on c/o PND or BENJAMIN      11/2020   Neg stress test 6/6/2017   EKG on 12/22/20 no ischemic changes noted  and similar to last EKGs   complaint with meds   Discussed nutrition / sodium intake / fluid intake   Recommend activity as tolerated   States PSA is elevated and having biopsy 11/7/21   Ok per cardiology for needed procedure   Fu  In 12 months    complaint with meds Discussed nutrition / sodium intake / fluid intake   Recommend activity as tolerated     Discussed nutrition / sodium intake / fluid intake   Recommend activity as tolerated       Plan:  hx PAT/ in NSR /denies palpitations/cp/SOB edema/ works out routinely / sleeps well on c/o PND or BENJAMIN       11/2020   Neg stress test 6/6/2017   EKG on 12/22/20 no ischemic changes noted  and similar to last EKGs   complaint with meds   Discussed nutrition / sodium intake / fluid intake   Recommend activity as tolerated     States PSA is elevated and having biopsy 11/7/21   Ok per cardiology for needed procedure   Fu  In 12 months with blood work PTV per PCP    Aloma Mcardle APRN,CVNP

## 2021-01-22 ENCOUNTER — IMMUNIZATION (OUTPATIENT)
Dept: PRIMARY CARE CLINIC | Age: 61
End: 2021-01-22
Payer: COMMERCIAL

## 2021-01-22 PROCEDURE — 91300 COVID-19, PFIZER VACCINE 30MCG/0.3ML DOSE: CPT | Performed by: FAMILY MEDICINE

## 2021-01-22 PROCEDURE — 0001A COVID-19, PFIZER VACCINE 30MCG/0.3ML DOSE: CPT | Performed by: FAMILY MEDICINE

## 2021-02-12 ENCOUNTER — IMMUNIZATION (OUTPATIENT)
Dept: PRIMARY CARE CLINIC | Age: 61
End: 2021-02-12
Payer: COMMERCIAL

## 2021-02-12 PROCEDURE — 91300 COVID-19, PFIZER VACCINE 30MCG/0.3ML DOSE: CPT | Performed by: FAMILY MEDICINE

## 2021-02-12 PROCEDURE — 0002A PR IMM ADMN SARSCOV2 30MCG/0.3ML DIL RECON 2ND DOSE: CPT | Performed by: FAMILY MEDICINE

## 2021-02-18 ENCOUNTER — TELEPHONE (OUTPATIENT)
Dept: FAMILY MEDICINE CLINIC | Age: 61
End: 2021-02-18

## 2021-02-25 ENCOUNTER — OFFICE VISIT (OUTPATIENT)
Dept: FAMILY MEDICINE CLINIC | Age: 61
End: 2021-02-25
Payer: COMMERCIAL

## 2021-02-25 VITALS
TEMPERATURE: 95.7 F | WEIGHT: 227 LBS | HEART RATE: 74 BPM | SYSTOLIC BLOOD PRESSURE: 120 MMHG | HEIGHT: 72 IN | OXYGEN SATURATION: 98 % | BODY MASS INDEX: 30.75 KG/M2 | DIASTOLIC BLOOD PRESSURE: 70 MMHG

## 2021-02-25 DIAGNOSIS — Z01.818 PREOP EXAMINATION: Primary | ICD-10-CM

## 2021-02-25 DIAGNOSIS — C61 PROSTATE CANCER (HCC): ICD-10-CM

## 2021-02-25 PROCEDURE — G8482 FLU IMMUNIZE ORDER/ADMIN: HCPCS | Performed by: FAMILY MEDICINE

## 2021-02-25 PROCEDURE — G8427 DOCREV CUR MEDS BY ELIG CLIN: HCPCS | Performed by: FAMILY MEDICINE

## 2021-02-25 PROCEDURE — G8417 CALC BMI ABV UP PARAM F/U: HCPCS | Performed by: FAMILY MEDICINE

## 2021-02-25 PROCEDURE — 99214 OFFICE O/P EST MOD 30 MIN: CPT | Performed by: FAMILY MEDICINE

## 2021-02-25 ASSESSMENT — PATIENT HEALTH QUESTIONNAIRE - PHQ9
1. LITTLE INTEREST OR PLEASURE IN DOING THINGS: 0
SUM OF ALL RESPONSES TO PHQ QUESTIONS 1-9: 0
SUM OF ALL RESPONSES TO PHQ QUESTIONS 1-9: 0
SUM OF ALL RESPONSES TO PHQ9 QUESTIONS 1 & 2: 0
SUM OF ALL RESPONSES TO PHQ QUESTIONS 1-9: 0

## 2021-02-25 ASSESSMENT — ENCOUNTER SYMPTOMS: RESPIRATORY NEGATIVE: 1

## 2021-02-25 NOTE — PROGRESS NOTES
Maureen Mitchell (:  1960) is a 63 y.o. male,Established patient, here for evaluation of the following chief complaint(s):  Pre-op Exam (Patient is scheduled for prostatectomy, lymph node dissection, and nerve sparing on 3/9/21 with Dr. Clotilde Stephens at [facility].)      ASSESSMENT/PLAN:  1. Preop examination  2. Prostate cancer [facility])  Medically patient is at acceptable risk to undergo planned surgery. No follow-ups on file. SUBJECTIVE/OBJECTIVE:  HPI   Pt is a of 63 y.o. male comes in today with   Chief Complaint   Patient presents with    Pre-op Exam     Patient is scheduled for prostatectomy, lymph node dissection, and nerve sparing on 3/9/21 with Dr. Clotilde Stephens at [facility]. Scheduled for prostate surgery. No personal or family history of adverse reaction to anesthesia or bleeding tendency. Allergies   Allergen Reactions    Kiwi Extract Anaphylaxis    Other Anaphylaxis     BEER    Wine [Alcohol] Anaphylaxis and Itching     Beverages       Current Outpatient Medications on File Prior to Visit   Medication Sig Dispense Refill    Flaxseed, Linseed, (FLAX SEED OIL) 1000 MG CAPS Take 1,000 mg by mouth daily      Cholecalciferol (VITAMIN D PO) Take 5,000 Units by mouth daily       GLUCOSAMINE CHONDROITIN COMPLX PO Take by mouth daily      Multiple Vitamins-Minerals (THERAPEUTIC MULTIVITAMIN-MINERALS) tablet Take 1 tablet by mouth daily      calcium carbonate (OSCAL) 500 MG TABS tablet Take 1,000 mg by mouth daily      magnesium gluconate (MAGONATE) 500 MG tablet Take 1,000 mg by mouth daily      Milk Thistle 1000 MG CAPS Take by mouth daily      ALPHA LIPOIC ACID PO Take by mouth daily      Omega-3 Fatty Acids (FISH OIL) 1000 MG CAPS Take 1,000 mg by mouth daily       No current facility-administered medications on file prior to visit. No past medical history on file.    IBS    Past Surgical History:   Procedure Laterality Date    ACHILLES TENDON SURGERY  7 years ago (2007)maxime    BICEPS TENDON REPAIR Left 02/09/2017    ACTUAL PROCEDURE: LEFT OPEN DISTAL BICEPS REPAIR    TONSILLECTOMY         Social History     Socioeconomic History    Marital status:      Spouse name: None    Number of children: None    Years of education: None    Highest education level: None   Occupational History    None   Social Needs    Financial resource strain: Not hard at all   [place] insecurity     Worry: Never true     Inability: Never true   [employer] needs     Medical: None     Non-medical: None   Tobacco Use    Smoking status: Never Smoker    Smokeless tobacco: Never Used   Substance and Sexual Activity    Alcohol use: No    Drug use: No    Sexual activity: None   Lifestyle    Physical activity     Days per week: None     Minutes per session: None    Stress: None   Relationships    Social connections     Talks on phone: None     Gets together: None     Attends Judaism service: None     Active member of club or organization: None     Attends meetings of clubs or organizations: None     Relationship status: None    Intimate partner violence     Fear of current or ex partner: None     Emotionally abused: None     Physically abused: None     Forced sexual activity: None   Other Topics Concern    None   Social History Narrative    None       Social History     Substance and Sexual Activity   Drug Use No       Family History   Problem Relation Age of Onset    Cancer Father 35        pancreatic    High Blood Pressure Brother      Vitals:    02/25/21 0915   BP: 120/70   Site: Right Upper Arm   Position: Sitting   Cuff Size: Medium Adult   Pulse: 74   Temp: 95.7 °F (35.4 °C)   TempSrc: Temporal   SpO2: 98%   Weight: 227 lb (103 kg)   Height: 6' (1.829 m)      Review of Systems   Constitutional: Negative. Respiratory: Negative. Cardiovascular: Negative. Hematological: Does not bruise/bleed easily. Psychiatric/Behavioral: Negative.         Physical Exam  Constitutional:       General: He is not in acute distress. Appearance: Normal appearance. He is well-developed. He is not diaphoretic. HENT:      Head: Normocephalic and atraumatic. Eyes:      General: No scleral icterus. Neck:      Musculoskeletal: Normal range of motion and neck supple. Thyroid: No thyromegaly. Trachea: No tracheal deviation. Cardiovascular:      Rate and Rhythm: Normal rate and regular rhythm. Heart sounds: Normal heart sounds. No murmur. Pulmonary:      Effort: Pulmonary effort is normal.      Breath sounds: Normal breath sounds. Lymphadenopathy:      Head:      Right side of head: No submental or submandibular adenopathy. Left side of head: No submental or submandibular adenopathy. Cervical: No cervical adenopathy. Skin:     General: Skin is warm and dry. Neurological:      Mental Status: He is alert and oriented to person, place, and time. Cranial Nerves: No cranial nerve deficit. Psychiatric:         Behavior: Behavior normal.         Thought Content: Thought content normal.         Judgment: Judgment normal.                 An electronic signature was used to authenticate this note.     --Sana Hayden MD

## 2021-02-26 ENCOUNTER — TELEPHONE (OUTPATIENT)
Dept: FAMILY MEDICINE CLINIC | Age: 61
End: 2021-02-26

## 2021-02-26 NOTE — TELEPHONE ENCOUNTER
Serafin Gonzales from [facility] called regarding patient's H&P that we recently faxed over to them. She says the H&P states there is no past medical history on file but this is one of the criterias to have this. Please refax the H&P with some medical history on to [phone]. Call back number is [phone]. Surgery is on March 9.     Thank you,

## 2021-03-02 ENCOUNTER — TELEPHONE (OUTPATIENT)
Dept: FAMILY MEDICINE CLINIC | Age: 61
End: 2021-03-02

## 2021-03-02 NOTE — TELEPHONE ENCOUNTER
Earnest Coe at [facility] calling to request patient's EKG tracing be faxed to them from December 22, 2020. This will need to have the provider's signature on it please. Please fax to [phone]. Contact [phone] is Janette's contact number if needed. The surgery is scheduled for March 9.

## 2022-01-04 ENCOUNTER — OFFICE VISIT (OUTPATIENT)
Dept: FAMILY MEDICINE CLINIC | Age: 62
End: 2022-01-04
Payer: COMMERCIAL

## 2022-01-04 VITALS
DIASTOLIC BLOOD PRESSURE: 72 MMHG | HEART RATE: 77 BPM | WEIGHT: 227 LBS | BODY MASS INDEX: 30.75 KG/M2 | OXYGEN SATURATION: 98 % | SYSTOLIC BLOOD PRESSURE: 120 MMHG | HEIGHT: 72 IN

## 2022-01-04 DIAGNOSIS — Z00.00 WELL ADULT EXAM: Primary | ICD-10-CM

## 2022-01-04 DIAGNOSIS — Z00.00 WELL ADULT EXAM: ICD-10-CM

## 2022-01-04 LAB
A/G RATIO: 1.9 (ref 1.1–2.2)
ALBUMIN SERPL-MCNC: 4.6 G/DL (ref 3.4–5)
ALP BLD-CCNC: 50 U/L (ref 40–129)
ALT SERPL-CCNC: 24 U/L (ref 10–40)
ANION GAP SERPL CALCULATED.3IONS-SCNC: 13 MMOL/L (ref 3–16)
AST SERPL-CCNC: 19 U/L (ref 15–37)
BILIRUB SERPL-MCNC: 0.9 MG/DL (ref 0–1)
BUN BLDV-MCNC: 15 MG/DL (ref 7–20)
C-REACTIVE PROTEIN, HIGH SENSITIVITY: 0.88 MG/L (ref 0.16–3)
CALCIUM SERPL-MCNC: 9.5 MG/DL (ref 8.3–10.6)
CHLORIDE BLD-SCNC: 104 MMOL/L (ref 99–110)
CHOLESTEROL, TOTAL: 200 MG/DL (ref 0–199)
CO2: 22 MMOL/L (ref 21–32)
CREAT SERPL-MCNC: 0.9 MG/DL (ref 0.8–1.3)
GFR AFRICAN AMERICAN: >60
GFR NON-AFRICAN AMERICAN: >60
GLUCOSE BLD-MCNC: 90 MG/DL (ref 70–99)
HDLC SERPL-MCNC: 34 MG/DL (ref 40–60)
LDL CHOLESTEROL CALCULATED: 134 MG/DL
POTASSIUM SERPL-SCNC: 4.2 MMOL/L (ref 3.5–5.1)
SODIUM BLD-SCNC: 139 MMOL/L (ref 136–145)
TOTAL PROTEIN: 7 G/DL (ref 6.4–8.2)
TRIGL SERPL-MCNC: 160 MG/DL (ref 0–150)
VLDLC SERPL CALC-MCNC: 32 MG/DL

## 2022-01-04 PROCEDURE — 99396 PREV VISIT EST AGE 40-64: CPT | Performed by: FAMILY MEDICINE

## 2022-01-04 PROCEDURE — G8484 FLU IMMUNIZE NO ADMIN: HCPCS | Performed by: FAMILY MEDICINE

## 2022-01-04 SDOH — ECONOMIC STABILITY: FOOD INSECURITY: WITHIN THE PAST 12 MONTHS, THE FOOD YOU BOUGHT JUST DIDN'T LAST AND YOU DIDN'T HAVE MONEY TO GET MORE.: NEVER TRUE

## 2022-01-04 SDOH — ECONOMIC STABILITY: FOOD INSECURITY: WITHIN THE PAST 12 MONTHS, YOU WORRIED THAT YOUR FOOD WOULD RUN OUT BEFORE YOU GOT MONEY TO BUY MORE.: NEVER TRUE

## 2022-01-04 ASSESSMENT — SOCIAL DETERMINANTS OF HEALTH (SDOH): HOW HARD IS IT FOR YOU TO PAY FOR THE VERY BASICS LIKE FOOD, HOUSING, MEDICAL CARE, AND HEATING?: NOT HARD AT ALL

## 2022-01-04 ASSESSMENT — PATIENT HEALTH QUESTIONNAIRE - PHQ9
SUM OF ALL RESPONSES TO PHQ9 QUESTIONS 1 & 2: 0
SUM OF ALL RESPONSES TO PHQ QUESTIONS 1-9: 0
1. LITTLE INTEREST OR PLEASURE IN DOING THINGS: 0
SUM OF ALL RESPONSES TO PHQ QUESTIONS 1-9: 0
2. FEELING DOWN, DEPRESSED OR HOPELESS: 0

## 2022-01-04 ASSESSMENT — ENCOUNTER SYMPTOMS: RESPIRATORY NEGATIVE: 1

## 2022-01-04 NOTE — PROGRESS NOTES
Luz Daniel (:  1960) is a 57 y.o. male,Established patient, here for evaluation of the following chief complaint(s): Annual Exam (Fasting (chewed 1 piece of sugarless gum). )         ASSESSMENT/PLAN:  Raquel Bush was seen today for annual exam.    Diagnoses and all orders for this visit:    Well adult exam  -     Lipid Panel; Future  -     Comprehensive Metabolic Panel; Future    good diet and exercise  Recovering from prostatectomy. No follow-ups on file. Subjective   SUBJECTIVE/OBJECTIVE:  HPI   Pt is a of 57 y.o. male comes in today with   Chief Complaint   Patient presents with    Annual Exam     Fasting (chewed 1 piece of sugarless gum). Following with urology for prostate cancer  Exercising daily. Diet has been good    Past Medical History:Reviewed  Medications:Reviewed. Allergies   Allergen Reactions    Kiwi Extract Anaphylaxis    Other Anaphylaxis     BEER    Wine [Alcohol] Anaphylaxis and Itching     Beverages      Social hx:Reviewed. Social History     Tobacco Use   Smoking Status Never Smoker   Smokeless Tobacco Never Used     Vitals:    22 0941   BP: 120/72   Site: Left Upper Arm   Position: Sitting   Cuff Size: Large Adult   Pulse: 77   SpO2: 98%   Weight: 227 lb (103 kg)   Height: 6' (1.829 m)        Review of Systems   Constitutional: Negative. Respiratory: Negative. Cardiovascular: Negative. Psychiatric/Behavioral: Negative. Objective   Physical Exam  Constitutional:       Appearance: He is well-developed. HENT:      Head: Normocephalic and atraumatic. Right Ear: Tympanic membrane, ear canal and external ear normal.      Left Ear: Tympanic membrane, ear canal and external ear normal.   Eyes:      General: No scleral icterus. Conjunctiva/sclera: Conjunctivae normal.   Neck:      Thyroid: No thyromegaly. Cardiovascular:      Rate and Rhythm: Normal rate and regular rhythm. Heart sounds: Normal heart sounds.  No murmur heard.      Pulmonary:      Effort: Pulmonary effort is normal.      Breath sounds: Normal breath sounds. No wheezing or rales. Abdominal:      General: Bowel sounds are normal. There is no distension. Palpations: Abdomen is soft. There is no hepatomegaly or splenomegaly. Tenderness: There is no abdominal tenderness. Musculoskeletal:      Cervical back: Normal range of motion and neck supple. Skin:     General: Skin is warm and dry. Neurological:      Mental Status: He is alert and oriented to person, place, and time. Cranial Nerves: No cranial nerve deficit. Deep Tendon Reflexes: Reflexes are normal and symmetric. Psychiatric:         Behavior: Behavior normal.         Thought Content: Thought content normal.         Judgment: Judgment normal.              An electronic signature was used to authenticate this note.     --Sherron Murguia MD

## 2022-01-11 ENCOUNTER — OFFICE VISIT (OUTPATIENT)
Dept: FAMILY MEDICINE CLINIC | Age: 62
End: 2022-01-11
Payer: COMMERCIAL

## 2022-01-11 VITALS
BODY MASS INDEX: 31.42 KG/M2 | HEIGHT: 72 IN | DIASTOLIC BLOOD PRESSURE: 88 MMHG | WEIGHT: 232 LBS | SYSTOLIC BLOOD PRESSURE: 148 MMHG | HEART RATE: 79 BPM | OXYGEN SATURATION: 97 %

## 2022-01-11 DIAGNOSIS — H92.02 LEFT EAR PAIN: ICD-10-CM

## 2022-01-11 DIAGNOSIS — H65.02 NON-RECURRENT ACUTE SEROUS OTITIS MEDIA OF LEFT EAR: Primary | ICD-10-CM

## 2022-01-11 PROCEDURE — G8484 FLU IMMUNIZE NO ADMIN: HCPCS | Performed by: NURSE PRACTITIONER

## 2022-01-11 PROCEDURE — 99213 OFFICE O/P EST LOW 20 MIN: CPT | Performed by: NURSE PRACTITIONER

## 2022-01-11 PROCEDURE — G8417 CALC BMI ABV UP PARAM F/U: HCPCS | Performed by: NURSE PRACTITIONER

## 2022-01-11 PROCEDURE — G8427 DOCREV CUR MEDS BY ELIG CLIN: HCPCS | Performed by: NURSE PRACTITIONER

## 2022-01-11 PROCEDURE — 1036F TOBACCO NON-USER: CPT | Performed by: NURSE PRACTITIONER

## 2022-01-11 PROCEDURE — 3017F COLORECTAL CA SCREEN DOC REV: CPT | Performed by: NURSE PRACTITIONER

## 2022-01-11 RX ORDER — METHYLPREDNISOLONE 4 MG/1
TABLET ORAL
Qty: 1 KIT | Refills: 1 | Status: SHIPPED | OUTPATIENT
Start: 2022-01-11 | End: 2022-01-17

## 2022-01-11 ASSESSMENT — ENCOUNTER SYMPTOMS
VOICE CHANGE: 0
COUGH: 0
CHOKING: 0
EYE ITCHING: 0
EYE PAIN: 0
BACK PAIN: 0
SINUS PAIN: 0
COLOR CHANGE: 0
PHOTOPHOBIA: 0
TROUBLE SWALLOWING: 0
BLOOD IN STOOL: 0
SORE THROAT: 0
DIARRHEA: 0
STRIDOR: 0
SHORTNESS OF BREATH: 0
ABDOMINAL PAIN: 0
VOMITING: 0
CONSTIPATION: 0
EYE REDNESS: 0
RHINORRHEA: 0
SINUS PRESSURE: 0
NAUSEA: 0
EYE DISCHARGE: 0
WHEEZING: 0
CHEST TIGHTNESS: 0

## 2022-01-11 NOTE — PROGRESS NOTES
Chief Complaint   Patient presents with    Otalgia     L ear ache x 2 days, teeth are now hurting. BP (!) 148/88 (Site: Left Upper Arm, Position: Sitting, Cuff Size: Medium Adult)   Pulse 79   Ht 6' (1.829 m)   Wt 232 lb (105.2 kg)   SpO2 97%   BMI 31.46 kg/m²     HPI:  Sana Hunt is a 57 y.o. (: 1960) here today   for   HPI    Patient's medications, allergies, past medical, surgical, social and family histories were reviewed and updated as appropriate. Ear pain: Left ear pain, started a few days ago. Happens every few years. No drainage, denies fever and chills. He took tylenol. Having some pain down his face. ROS:  Review of Systems   Constitutional: Negative for activity change, appetite change, chills, diaphoresis, fatigue, fever and unexpected weight change. HENT: Positive for ear pain. Negative for congestion, ear discharge, hearing loss, nosebleeds, postnasal drip, rhinorrhea, sinus pressure, sinus pain, sneezing, sore throat, tinnitus, trouble swallowing and voice change. Eyes: Negative for photophobia, pain, discharge, redness and itching. Respiratory: Negative for cough, choking, chest tightness, shortness of breath, wheezing and stridor. Cardiovascular: Negative for chest pain, palpitations and leg swelling. Gastrointestinal: Negative for abdominal pain, blood in stool, constipation, diarrhea, nausea and vomiting. Endocrine: Negative for cold intolerance, heat intolerance, polydipsia and polyuria. Genitourinary: Negative for difficulty urinating, dysuria, enuresis, flank pain, frequency, hematuria and urgency. Musculoskeletal: Negative for back pain, gait problem, joint swelling, neck pain and neck stiffness. Skin: Negative for color change, pallor, rash and wound. Allergic/Immunologic: Negative for environmental allergies and food allergies.    Neurological: Negative for dizziness, tremors, syncope, speech difficulty, weakness, light-headedness, numbness and headaches. Hematological: Negative for adenopathy. Does not bruise/bleed easily. Psychiatric/Behavioral: Negative for agitation, behavioral problems, confusion, decreased concentration, dysphoric mood, hallucinations, self-injury, sleep disturbance and suicidal ideas. The patient is not nervous/anxious and is not hyperactive. Prior to Visit Medications    Medication Sig Taking? Authorizing Provider   methylPREDNISolone (MEDROL DOSEPACK) 4 MG tablet Take by mouth. Yes Bryn Dhillon, APRN - CNP   Flaxseed, Linseed, (FLAX SEED OIL) 1000 MG CAPS Take 1,000 mg by mouth daily Yes Historical Provider, MD   Omega-3 Fatty Acids (FISH OIL) 1000 MG CAPS Take 1,000 mg by mouth daily Yes Historical Provider, MD   Cholecalciferol (VITAMIN D PO) Take 5,000 Units by mouth daily  Yes Historical Provider, MD   [address] Take by mouth daily Yes Historical Provider, MD   Multiple Vitamins-Minerals (THERAPEUTIC MULTIVITAMIN-MINERALS) tablet Take 1 tablet by mouth daily Yes Historical Provider, MD   calcium carbonate (OSCAL) 500 MG TABS tablet Take 1,000 mg by mouth daily Yes Historical Provider, MD   magnesium gluconate (MAGONATE) 500 MG tablet Take 1,000 mg by mouth daily Yes Historical Provider, MD   Milk Thistle 1000 MG CAPS Take by mouth daily Yes Historical Provider, MD   ALPHA LIPOIC ACID PO Take by mouth daily Yes Historical Provider, MD       Allergies   Allergen Reactions    Kiwi Extract Anaphylaxis    Other Anaphylaxis     BEER    Wine [Alcohol] Anaphylaxis and Itching     Beverages       OBJECTIVE:      BP Readings from Last 2 Encounters:   01/11/22 (!) 148/88   01/04/22 120/72       Wt Readings from Last 3 Encounters:   01/11/22 232 lb (105.2 kg)   01/04/22 227 lb (103 kg)   02/25/21 227 lb (103 kg)       Physical Exam  Vitals reviewed. Constitutional:       General: He is not in acute distress. Appearance: Normal appearance. He is well-developed.    HENT:      Head: Normocephalic and atraumatic. Right Ear: Hearing, tympanic membrane, ear canal and external ear normal.      Left Ear: Hearing, ear canal and external ear normal. Tympanic membrane is bulging. Ears:      Comments: Bulging TM, tight, pink streaks      Nose: Nose normal.      Right Sinus: No maxillary sinus tenderness or frontal sinus tenderness. Left Sinus: No maxillary sinus tenderness or frontal sinus tenderness. Mouth/Throat:      Pharynx: No oropharyngeal exudate. Eyes:      General:         Right eye: No discharge. Left eye: No discharge. Neck:      Thyroid: No thyromegaly. Vascular: No JVD. Trachea: No tracheal deviation. Cardiovascular:      Rate and Rhythm: Normal rate and regular rhythm. Heart sounds: Normal heart sounds. No murmur heard. No friction rub. Pulmonary:      Effort: Pulmonary effort is normal. No respiratory distress. Breath sounds: Normal breath sounds. No stridor. No decreased breath sounds, wheezing, rhonchi or rales. Musculoskeletal:         General: No tenderness. Normal range of motion. Cervical back: Normal range of motion. Lymphadenopathy:      Cervical: No cervical adenopathy. Skin:     General: Skin is warm and dry. Capillary Refill: Capillary refill takes less than 2 seconds. Findings: No rash. Neurological:      Mental Status: He is alert and oriented to person, place, and time. Sensory: Sensation is intact. Motor: Motor function is intact. Coordination: Coordination normal.   Psychiatric:         Attention and Perception: Attention and perception normal.         Mood and Affect: Mood normal.         Speech: Speech normal.         Behavior: Behavior normal. Behavior is cooperative. Thought Content:  Thought content normal.         Cognition and Memory: Cognition normal.         Judgment: Judgment normal.       ASSESSMENT/PLAN:    1. Non-recurrent acute serous otitis media of left ear    - methylPREDNISolone (MEDROL DOSEPACK) 4 MG tablet; Take by mouth. Dispense: 1 kit; Refill: 1    2. Left ear pain    - methylPREDNISolone (MEDROL DOSEPACK) 4 MG tablet; Take by mouth. Dispense: 1 kit; Refill: 1    Follow up if symptoms do not improve or worsen. If the patient becomes short of breath go straight to the ER or call 911.

## 2022-06-08 LAB — PSA, TOTAL: 0.02 NG/ML (ref 0–4)

## 2022-07-19 ENCOUNTER — OFFICE VISIT (OUTPATIENT)
Dept: URGENT CARE | Age: 62
End: 2022-07-19
Payer: COMMERCIAL

## 2022-07-19 VITALS
DIASTOLIC BLOOD PRESSURE: 91 MMHG | HEART RATE: 123 BPM | TEMPERATURE: 97.4 F | HEIGHT: 73 IN | WEIGHT: 224 LBS | SYSTOLIC BLOOD PRESSURE: 138 MMHG | OXYGEN SATURATION: 97 % | RESPIRATION RATE: 20 BRPM | BODY MASS INDEX: 29.69 KG/M2

## 2022-07-19 DIAGNOSIS — U07.1 COVID-19: ICD-10-CM

## 2022-07-19 DIAGNOSIS — Z91.89 AT INCREASED RISK OF EXPOSURE TO COVID-19 VIRUS: ICD-10-CM

## 2022-07-19 DIAGNOSIS — J02.9 SORE THROAT: Primary | ICD-10-CM

## 2022-07-19 LAB
Lab: NORMAL
PERFORMING INSTRUMENT: NORMAL
QC PASS/FAIL: NORMAL
S PYO AG THROAT QL: NORMAL
SARS-COV-2, POC: NORMAL

## 2022-07-19 PROCEDURE — 87880 STREP A ASSAY W/OPTIC: CPT | Performed by: NURSE PRACTITIONER

## 2022-07-19 PROCEDURE — 87426 SARSCOV CORONAVIRUS AG IA: CPT | Performed by: NURSE PRACTITIONER

## 2022-07-19 PROCEDURE — 99213 OFFICE O/P EST LOW 20 MIN: CPT | Performed by: NURSE PRACTITIONER

## 2022-07-19 ASSESSMENT — ENCOUNTER SYMPTOMS
WHEEZING: 0
ABDOMINAL PAIN: 0
SINUS PRESSURE: 0
TROUBLE SWALLOWING: 0
VOICE CHANGE: 0
COUGH: 0
VOMITING: 0
DIARRHEA: 0
NAUSEA: 0
SORE THROAT: 1
RHINORRHEA: 1
SHORTNESS OF BREATH: 0
EYES NEGATIVE: 1
SINUS PAIN: 0

## 2022-07-19 NOTE — PATIENT INSTRUCTIONS
Your Covid antigen test in clinic was negative    Your Covid PCR test will be sent to  the lab. We will call you with the results as soon as possible (you may also view results on [email or web address]).      Keep your self hydrated    Avoid crowded areas especially in doors    May use Tylenol for pain or fever as needed    May user Delsym for cough  if needed    Go to the ER if you develop shortness of breath or chest pain    Call the clinic with any questions

## 2022-07-19 NOTE — PROGRESS NOTES
[address] [address]  Dept: [phone]  Dept Fax: [phone]  Loc: [phone]  Margaret Becker is a 60 y.o. male who presents today for his medical conditions/complaintsas noted below. Margaret Becker is c/o of Headache, Covid Testing, and Pharyngitis      HPI:     Pt indicates they went to his grand child's birthday party on Saturday. He develop itchy throat and nasal stuffiness on Sunday. He indicates he tested himself for Covid at home this morning and the test was positive. No past medical history on file. Past Surgical History:   Procedure Laterality Date    ACHILLES TENDON SURGERY  7 years ago (2007)maxime    BICEPS TENDON REPAIR Left 02/09/2017    ACTUAL PROCEDURE: LEFT OPEN DISTAL BICEPS REPAIR    TONSILLECTOMY         Family History   Problem Relation Age of Onset    Cancer Father 35        pancreatic    High Blood Pressure Brother        Social History     Tobacco Use    Smoking status: Never    Smokeless tobacco: Never   Substance Use Topics    Alcohol use: No      Current Outpatient Medications   Medication Sig Dispense Refill    Flaxseed, Linseed, (FLAX SEED OIL) 1000 MG CAPS Take 1,000 mg by mouth daily      Omega-3 Fatty Acids (FISH OIL) 1000 MG CAPS Take 1,000 mg by mouth daily      Cholecalciferol (VITAMIN D PO) Take 5,000 Units by mouth daily       GLUCOSAMINE CHONDROITIN COMPLX PO Take by mouth daily      Multiple Vitamins-Minerals (THERAPEUTIC MULTIVITAMIN-MINERALS) tablet Take 1 tablet by mouth daily      calcium carbonate (OSCAL) 500 MG TABS tablet Take 1,000 mg by mouth daily      magnesium gluconate (MAGONATE) 500 MG tablet Take 1,000 mg by mouth daily      Milk Thistle 1000 MG CAPS Take by mouth daily      ALPHA LIPOIC ACID PO Take by mouth daily       No current facility-administered medications for this visit.      Allergies   Allergen Reactions    Kiwi Extract Anaphylaxis    Other Anaphylaxis BEER    Wine [Alcohol] Anaphylaxis and Itching     Beverages       Health Maintenance   Topic Date Due    Flu vaccine (1) 09/01/2022    Depression Screen  01/04/2023    Prostate Specific Antigen (PSA) Screening or Monitoring  06/07/2023    Lipids  01/04/2027    Colorectal Cancer Screen  07/03/2027    DTaP/Tdap/Td vaccine (2 - Td or Tdap) 07/16/2027    Shingles vaccine  Completed    COVID-19 Vaccine  Completed    Hepatitis C screen  Completed    HIV screen  Completed    Hepatitis A vaccine  Aged Out    Hepatitis B vaccine  Aged Out    Hib vaccine  Aged Out    Meningococcal (ACWY) vaccine  Aged Out    Pneumococcal 0-64 years Vaccine  Aged Out       Subjective:     Review of Systems   Constitutional:  Negative for activity change, appetite change, chills, diaphoresis, fatigue, fever and unexpected weight change. HENT:  Positive for congestion, rhinorrhea and sore throat. Negative for ear discharge, ear pain, hearing loss, postnasal drip, sinus pressure, sinus pain, sneezing, tinnitus, trouble swallowing and voice change. Eyes: Negative. Respiratory:  Negative for cough, shortness of breath and wheezing. Cardiovascular:  Negative for chest pain. Gastrointestinal:  Negative for abdominal pain, diarrhea, nausea and vomiting. Endocrine: Negative for cold intolerance and heat intolerance. Genitourinary: Negative. Musculoskeletal: Negative. Skin: Negative. Neurological:  Negative for dizziness, light-headedness and headaches. Objective:     Physical Exam  Vitals and nursing note reviewed. Constitutional:       General: He is not in acute distress. Appearance: He is not ill-appearing. HENT:      Head: Normocephalic and atraumatic. Nose: No congestion or rhinorrhea. Mouth/Throat:      Pharynx: No oropharyngeal exudate or posterior oropharyngeal erythema. Cardiovascular:      Rate and Rhythm: Normal rate and regular rhythm. Pulses: Normal pulses.       Heart sounds: Normal heart sounds. No murmur heard. Pulmonary:      Effort: Pulmonary effort is normal. No respiratory distress. Breath sounds: Normal breath sounds. No wheezing or rhonchi. Chest:      Chest wall: No tenderness. Abdominal:      General: Bowel sounds are normal. There is no distension. Palpations: Abdomen is soft. Tenderness: There is no abdominal tenderness. Musculoskeletal:         General: Normal range of motion. Cervical back: Normal range of motion. Skin:     General: Skin is warm and dry. Findings: No bruising. Neurological:      General: No focal deficit present. Mental Status: He is alert. Psychiatric:         Behavior: Behavior normal.     BP (!) 138/91   Pulse (!) 123   Temp 97.4 °F (36.3 °C)   Resp 20   Ht 6' 1\" (1.854 m)   Wt 224 lb (101.6 kg)   SpO2 97%   BMI 29.55 kg/m²     Assessment:      Diagnosis Orders   1. Sore throat          Results for orders placed or performed in visit on 07/19/22   POCT COVID-19, Antigen   Result Value Ref Range    SARS-COV-2, POC Not-Detected Not Detected    Lot Number [id number]     QC Pass/Fail Pass     Performing Instrument BD Veritor    POCT rapid strep A   Result Value Ref Range    Strep A Ag None Detected None Detected         Pt's rapid antigen Covid test in clinic was negative (pt indicates his home test was positive today). Will send PCR Covid test as a result. Treat symptoms as the appear with over the counter medications . Plan:       Discussed negative Covid and strep test with pt and his wife ( who is in room)  WE will call pt with Covid PCR test result as soon as available. Will send prescription to pharmacy if test is positive. (Pt and spouse agrees with this plan)     Patient given educationalmaterials - see patient instructions. Discussed use, benefit, and side effectsof prescribed medications. All patient questions answered. Pt voiced understanding. Reviewed health maintenance.   Instructed to continue current medications, diet andexercise. Patient agreed with treatment plan. Follow up as directed.            Electronically signed by GINA Ortega CNP on 7/19/2022 at 3:09 PM

## 2022-07-20 LAB — SARS-COV-2: DETECTED

## 2022-07-20 NOTE — PROGRESS NOTES
Patient called clinic that he noticed on Mychart that his Covid PCR test is positive. Positive PCR test noted in patient's chart. Paxlovid sent to pharmacy on file. Pt made aware and explained dosing. He verbalized understanding.

## 2022-10-04 LAB — PSA, TOTAL: 0.03 NG/ML (ref 0–4)

## 2022-10-28 LAB
BUN / CREAT RATIO: 18.9 RATIO (ref 9–28)
BUN BLDV-MCNC: 17 MG/DL (ref 6–20)
CALCIUM SERPL-MCNC: 9.7 MG/DL (ref 8.6–10.3)
CHLORIDE BLD-SCNC: 106 MMOL/L (ref 97–107)
CO2: 26 MMOL/L (ref 21–31)
CREAT SERPL-MCNC: 0.9 MG/DL (ref 0.7–1.2)
GLUCOSE: 87 MG/DL (ref 74–106)
HCT VFR BLD CALC: 48.5 % (ref 40–51)
HEMOGLOBIN: 16.1 G/DL (ref 13.7–17.5)
MCH RBC QN AUTO: 29.8 PG (ref 26–34)
MCHC RBC AUTO-ENTMCNC: 33.2 G/DL (ref 30.7–35.5)
MCV RBC AUTO: 90 FL (ref 80–100)
PDW BLD-RTO: 13.9 % (ref 11.5–14.5)
PLATELETS: 164 10*3/UL (ref 155–369)
PMV BLD AUTO: 9.6 FL (ref 8.8–12.5)
POTASSIUM SERPL-SCNC: 4 MMOL/L (ref 3.6–5)
RBC: 5.39 10*6/UL (ref 4.6–6.1)
SODIUM BLD-SCNC: 138 MMOL/L (ref 135–145)
WBC: 5.3 10*3/UL (ref 3.7–10.3)

## 2022-10-29 LAB — URINE CULTURE, ROUTINE: NORMAL

## 2022-10-31 ENCOUNTER — OFFICE VISIT (OUTPATIENT)
Dept: FAMILY MEDICINE CLINIC | Age: 62
End: 2022-10-31
Payer: COMMERCIAL

## 2022-10-31 VITALS
HEIGHT: 73 IN | SYSTOLIC BLOOD PRESSURE: 142 MMHG | DIASTOLIC BLOOD PRESSURE: 86 MMHG | OXYGEN SATURATION: 96 % | BODY MASS INDEX: 30.17 KG/M2 | HEART RATE: 75 BPM | WEIGHT: 227.6 LBS

## 2022-10-31 DIAGNOSIS — Z01.818 PREOP EXAMINATION: Primary | ICD-10-CM

## 2022-10-31 DIAGNOSIS — N39.3 STRESS INCONTINENCE: ICD-10-CM

## 2022-10-31 PROCEDURE — G8484 FLU IMMUNIZE NO ADMIN: HCPCS | Performed by: FAMILY MEDICINE

## 2022-10-31 PROCEDURE — G8427 DOCREV CUR MEDS BY ELIG CLIN: HCPCS | Performed by: FAMILY MEDICINE

## 2022-10-31 PROCEDURE — G8417 CALC BMI ABV UP PARAM F/U: HCPCS | Performed by: FAMILY MEDICINE

## 2022-10-31 PROCEDURE — 99212 OFFICE O/P EST SF 10 MIN: CPT | Performed by: FAMILY MEDICINE

## 2022-10-31 ASSESSMENT — ENCOUNTER SYMPTOMS: RESPIRATORY NEGATIVE: 1

## 2022-10-31 NOTE — PROGRESS NOTES
Jessica Murray (:  1960) is a 60 y.o. male,Established patient, here for evaluation of the following chief complaint(s):  Pre-op Exam (Patient is scheduled for male sling insertion on 11/10/22 with Dr. Jamie Goodwin at [facility]. )         ASSESSMENT/PLAN:  Noelle Calderón was seen today for pre-op exam.    Diagnoses and all orders for this visit:    Preop examination  Medically patient is at acceptable risk to undergo planned surgery. Stress incontinence       No follow-ups on file. Subjective   SUBJECTIVE/OBJECTIVE:  HPI  Pt is a of 60 y.o. male comes in today with   Chief Complaint   Patient presents with    Pre-op Exam     Patient is scheduled for male sling insertion on 11/10/22 with Dr. Jamie Goodwin at [facility]. No personal or family history of adverse reaction to anesthesia or bleeding tendency. No change in exercise tolerance. Walks regularly. Allergies   Allergen Reactions    Kiwi Extract Anaphylaxis    Other Anaphylaxis     BEER    Wine [Alcohol] Anaphylaxis and Itching     Beverages       Current Outpatient Medications on File Prior to Visit   Medication Sig Dispense Refill    Flaxseed, Linseed, (FLAX SEED OIL) 1000 MG CAPS Take 1,000 mg by mouth daily      Omega-3 Fatty Acids (FISH OIL) 1000 MG CAPS Take 1,000 mg by mouth daily      Cholecalciferol (VITAMIN D PO) Take 5,000 Units by mouth daily       GLUCOSAMINE CHONDROITIN COMPLX PO Take by mouth daily      Multiple Vitamins-Minerals (THERAPEUTIC MULTIVITAMIN-MINERALS) tablet Take 1 tablet by mouth daily      calcium carbonate (OSCAL) 500 MG TABS tablet Take 1,000 mg by mouth daily      magnesium gluconate (MAGONATE) 500 MG tablet Take 1,000 mg by mouth daily      Milk Thistle 1000 MG CAPS Take by mouth daily      ALPHA LIPOIC ACID PO Take by mouth daily       No current facility-administered medications on file prior to visit. No past medical history on file.     Past Surgical History:   Procedure Laterality Date ACHILLES TENDON SURGERY  7 years ago (2007)maxime    BICEPS TENDON REPAIR Left 02/09/2017    ACTUAL PROCEDURE: LEFT OPEN DISTAL BICEPS REPAIR    TONSILLECTOMY         Social History     Socioeconomic History    Marital status:      Spouse name: None    Number of children: None    Years of education: None    Highest education level: None   Tobacco Use    Smoking status: Never    Smokeless tobacco: Never   Substance and Sexual Activity    Alcohol use: No    Drug use: No     Social Determinants of Health     Financial Resource Strain: Low Risk     Difficulty of Paying Living Expenses: Not hard at all   Food Insecurity: No Food Insecurity    Worried About Running Out of Food in the Last Year: Never true    Ran Out of Food in the Last Year: Never true       Social History     Substance and Sexual Activity   Drug Use No       Family History   Problem Relation Age of Onset    Cancer Father 35        pancreatic    High Blood Pressure Brother       Vitals:    10/31/22 1506   BP: (!) 142/86   Site: Left Upper Arm   Position: Sitting   Cuff Size: Medium Adult   Pulse: 75   SpO2: 96%   Weight: 227 lb 9.6 oz (103.2 kg)   Height: 6' 1\" (1.854 m)       Review of Systems   Constitutional: Negative. Respiratory: Negative. Cardiovascular: Negative. Hematological:  Does not bruise/bleed easily. Objective   Physical Exam  Constitutional:       Appearance: Normal appearance. Cardiovascular:      Rate and Rhythm: Normal rate and regular rhythm. Heart sounds: No murmur heard. Pulmonary:      Effort: Pulmonary effort is normal.      Breath sounds: Normal breath sounds. Musculoskeletal:      Cervical back: Normal range of motion and neck supple. Lymphadenopathy:      Cervical: No cervical adenopathy. Skin:     Capillary Refill: Capillary refill takes less than 2 seconds. Neurological:      General: No focal deficit present. Mental Status: He is alert.    Psychiatric:         Mood and Affect: Mood normal.            An electronic signature was used to authenticate this note.     --Janneth Braxton MD

## 2022-11-07 ENCOUNTER — TELEPHONE (OUTPATIENT)
Dept: FAMILY MEDICINE CLINIC | Age: 62
End: 2022-11-07

## 2022-11-07 NOTE — TELEPHONE ENCOUNTER
Past Medical History spot was not completed properly. Francesca Simental states the pt has IBS, Prostate Cancer, etc.    She also stated one of the pages needs to be signed by Dr. Liliana Hsieh. Please updated and re-fax.

## 2022-11-09 PROBLEM — K58.9 IRRITABLE BOWEL SYNDROME: Status: ACTIVE | Noted: 2022-11-09

## 2022-11-09 PROBLEM — C61 PROSTATE CANCER (HCC): Status: ACTIVE | Noted: 2021-03-09

## 2022-11-09 PROBLEM — R31.0 HEMATURIA, GROSS: Status: ACTIVE | Noted: 2021-04-06

## 2022-11-09 PROBLEM — Q76.2 CONGENITAL SPONDYLOLISTHESIS: Status: ACTIVE | Noted: 2022-11-09

## 2022-12-06 LAB — PSA, TOTAL: 0.03 NG/ML (ref 0–4)

## 2023-02-17 LAB
ALBUMIN SERPL-MCNC: 4.6 G/DL (ref 3.5–5.2)
ALP BLD-CCNC: 55 U/L (ref 53–128)
ALT SERPL-CCNC: 50 U/L (ref 0–45)
AST SERPL-CCNC: 20 U/L (ref 5–34)
BILIRUB SERPL-MCNC: 0.5 MG/DL (ref 0.1–1.2)
BUN / CREAT RATIO: 22 RATIO (ref 9–28)
BUN BLDV-MCNC: 22 MG/DL (ref 6–20)
CALCIUM SERPL-MCNC: 9.6 MG/DL (ref 8.6–10.3)
CHLORIDE BLD-SCNC: 100 MMOL/L (ref 97–107)
CO2: 23 MMOL/L (ref 21–31)
CREAT SERPL-MCNC: 1 MG/DL (ref 0.7–1.2)
GLUCOSE: 91 MG/DL (ref 74–106)
POTASSIUM SERPL-SCNC: 4.3 MMOL/L (ref 3.6–5)
PSA, TOTAL: <0.01 NG/ML (ref 0–4)
SODIUM BLD-SCNC: 135 MMOL/L (ref 135–145)
TESTOSTERONE: 13 NG/DL (ref 175–781)
TOTAL PROTEIN: 6.7 G/DL (ref 6.4–8.3)

## 2023-03-07 ENCOUNTER — OFFICE VISIT (OUTPATIENT)
Dept: FAMILY MEDICINE CLINIC | Age: 63
End: 2023-03-07
Payer: COMMERCIAL

## 2023-03-07 VITALS
HEIGHT: 73 IN | OXYGEN SATURATION: 98 % | WEIGHT: 225.6 LBS | SYSTOLIC BLOOD PRESSURE: 118 MMHG | BODY MASS INDEX: 29.9 KG/M2 | HEART RATE: 74 BPM | DIASTOLIC BLOOD PRESSURE: 82 MMHG

## 2023-03-07 DIAGNOSIS — Z00.00 WELL ADULT EXAM: Primary | ICD-10-CM

## 2023-03-07 PROCEDURE — 99396 PREV VISIT EST AGE 40-64: CPT | Performed by: FAMILY MEDICINE

## 2023-03-07 PROCEDURE — G8484 FLU IMMUNIZE NO ADMIN: HCPCS | Performed by: FAMILY MEDICINE

## 2023-03-07 RX ORDER — RELUGOLIX 120 MG/1
1 TABLET, FILM COATED ORAL DAILY
COMMUNITY
Start: 2023-03-06

## 2023-03-07 SDOH — ECONOMIC STABILITY: FOOD INSECURITY: WITHIN THE PAST 12 MONTHS, YOU WORRIED THAT YOUR FOOD WOULD RUN OUT BEFORE YOU GOT MONEY TO BUY MORE.: NEVER TRUE

## 2023-03-07 SDOH — ECONOMIC STABILITY: FOOD INSECURITY: WITHIN THE PAST 12 MONTHS, THE FOOD YOU BOUGHT JUST DIDN'T LAST AND YOU DIDN'T HAVE MONEY TO GET MORE.: NEVER TRUE

## 2023-03-07 SDOH — ECONOMIC STABILITY: INCOME INSECURITY: HOW HARD IS IT FOR YOU TO PAY FOR THE VERY BASICS LIKE FOOD, HOUSING, MEDICAL CARE, AND HEATING?: NOT HARD AT ALL

## 2023-03-07 SDOH — ECONOMIC STABILITY: HOUSING INSECURITY
IN THE LAST 12 MONTHS, WAS THERE A TIME WHEN YOU DID NOT HAVE A STEADY PLACE TO SLEEP OR SLEPT IN A SHELTER (INCLUDING NOW)?: NO

## 2023-03-07 ASSESSMENT — PATIENT HEALTH QUESTIONNAIRE - PHQ9
SUM OF ALL RESPONSES TO PHQ9 QUESTIONS 1 & 2: 0
SUM OF ALL RESPONSES TO PHQ QUESTIONS 1-9: 0
2. FEELING DOWN, DEPRESSED OR HOPELESS: 0
1. LITTLE INTEREST OR PLEASURE IN DOING THINGS: 0
SUM OF ALL RESPONSES TO PHQ QUESTIONS 1-9: 0

## 2023-03-07 ASSESSMENT — ENCOUNTER SYMPTOMS: RESPIRATORY NEGATIVE: 1

## 2023-03-07 NOTE — PROGRESS NOTES
Tigre Mattson (:  1960) is a 60 y.o. male,Established patient, here for evaluation of the following chief complaint(s): Annual Exam         ASSESSMENT/PLAN:  Bubba Souza was seen today for annual exam.    Diagnoses and all orders for this visit:    Well adult exam  -     Lipid Panel; Future  -     Comprehensive Metabolic Panel; Future  -     CRP,High Sensitivity; Future  -     CT CARDIAC CALCIUM SCORING; Future  Requests crp since strong fam hx. Can get calcium score as well. Good diet and exercise     No follow-ups on file. Subjective   SUBJECTIVE/OBJECTIVE:  HPI  Pt is a of 60 y.o. male comes in today with   Chief Complaint   Patient presents with    Annual Exam     Here for annual.   Has been feeling well. Recurrence of prostate cancer. Just finished xrt  On hrt for 2 years as well. Still exercising 30 minutes/day. Diet has been good    Vitals:    23 1342   BP: 118/82   Site: Left Upper Arm   Position: Sitting   Cuff Size: Medium Adult   Pulse: 74   SpO2: 98%   Weight: 225 lb 9.6 oz (102.3 kg)   Height: 6' 1\" (1.854 m)      Past Medical History:Reviewed  Medications:Reviewed. Allergies   Allergen Reactions    Kiwi Extract Anaphylaxis    Other Anaphylaxis     BEER    Wine [Alcohol] Anaphylaxis and Itching     Beverages      Social hx:Reviewed. Social History     Tobacco Use   Smoking Status Never   Smokeless Tobacco Never        Review of Systems   Constitutional: Negative. Respiratory: Negative. Cardiovascular: Negative. Objective   Physical Exam  Constitutional:       Appearance: Normal appearance. He is well-developed. HENT:      Head: Normocephalic and atraumatic. Right Ear: Tympanic membrane, ear canal and external ear normal.      Left Ear: Tympanic membrane, ear canal and external ear normal.      Mouth/Throat:      Pharynx: Oropharynx is clear. Eyes:      General: No scleral icterus.      Conjunctiva/sclera: Conjunctivae normal.   Neck:      Thyroid: No thyromegaly. Cardiovascular:      Rate and Rhythm: Normal rate and regular rhythm. Heart sounds: Normal heart sounds. No murmur heard. Pulmonary:      Effort: Pulmonary effort is normal.      Breath sounds: Normal breath sounds. No wheezing or rales. Abdominal:      General: Bowel sounds are normal. There is no distension. Palpations: Abdomen is soft. There is no hepatomegaly or splenomegaly. Tenderness: There is no abdominal tenderness. Musculoskeletal:      Cervical back: Normal range of motion and neck supple. Skin:     General: Skin is warm and dry. Neurological:      Mental Status: He is alert and oriented to person, place, and time. Cranial Nerves: No cranial nerve deficit. Deep Tendon Reflexes: Reflexes are normal and symmetric. Psychiatric:         Behavior: Behavior normal.         Thought Content: Thought content normal.         Judgment: Judgment normal.            An electronic signature was used to authenticate this note.     --Joyice Meigs, MD

## 2023-03-09 DIAGNOSIS — Z00.00 WELL ADULT EXAM: ICD-10-CM

## 2023-03-09 LAB
A/G RATIO: 2 (ref 1.1–2.2)
ALBUMIN SERPL-MCNC: 4.5 G/DL (ref 3.4–5)
ALP BLD-CCNC: 69 U/L (ref 40–129)
ALT SERPL-CCNC: 35 U/L (ref 10–40)
ANION GAP SERPL CALCULATED.3IONS-SCNC: 11 MMOL/L (ref 3–16)
AST SERPL-CCNC: 20 U/L (ref 15–37)
BILIRUB SERPL-MCNC: 0.4 MG/DL (ref 0–1)
BUN BLDV-MCNC: 18 MG/DL (ref 7–20)
C-REACTIVE PROTEIN, HIGH SENSITIVITY: 0.98 MG/L (ref 0.16–3)
CALCIUM SERPL-MCNC: 9.5 MG/DL (ref 8.3–10.6)
CHLORIDE BLD-SCNC: 104 MMOL/L (ref 99–110)
CHOLESTEROL, TOTAL: 220 MG/DL (ref 0–199)
CO2: 27 MMOL/L (ref 21–32)
CREAT SERPL-MCNC: 0.9 MG/DL (ref 0.8–1.3)
GFR SERPL CREATININE-BSD FRML MDRD: >60 ML/MIN/{1.73_M2}
GLUCOSE BLD-MCNC: 99 MG/DL (ref 70–99)
HDLC SERPL-MCNC: 43 MG/DL (ref 40–60)
LDL CHOLESTEROL CALCULATED: 147 MG/DL
POTASSIUM SERPL-SCNC: 4.4 MMOL/L (ref 3.5–5.1)
SODIUM BLD-SCNC: 142 MMOL/L (ref 136–145)
TOTAL PROTEIN: 6.8 G/DL (ref 6.4–8.2)
TRIGL SERPL-MCNC: 148 MG/DL (ref 0–150)
VLDLC SERPL CALC-MCNC: 30 MG/DL

## 2023-03-15 RX ORDER — ROSUVASTATIN CALCIUM 10 MG/1
10 TABLET, COATED ORAL DAILY
Qty: 90 TABLET | Refills: 3 | Status: SHIPPED | OUTPATIENT
Start: 2023-03-15

## 2023-03-20 NOTE — TELEPHONE ENCOUNTER
Medication:   Requested Prescriptions     Pending Prescriptions Disp Refills    rosuvastatin (CRESTOR) 10 MG tablet 90 tablet 3     Sig: Take 1 tablet by mouth daily        Last Filled:  3/15/23    Patient Phone Number: [phone] (home) [phone] (work)    Last appt: 3/7/2023   Next appt: Visit date not found    Last OARRS: No flowsheet data found.

## 2023-03-21 RX ORDER — ROSUVASTATIN CALCIUM 10 MG/1
10 TABLET, COATED ORAL DAILY
Qty: 90 TABLET | Refills: 3 | Status: SHIPPED | OUTPATIENT
Start: 2023-03-21 | End: 2023-03-23 | Stop reason: SDUPTHER

## 2023-03-22 NOTE — TELEPHONE ENCOUNTER
[facility] 30 Day supply needed. Send to [facility] in [place].

## 2023-03-23 RX ORDER — ROSUVASTATIN CALCIUM 10 MG/1
10 TABLET, COATED ORAL DAILY
Qty: 90 TABLET | Refills: 3 | Status: SHIPPED | OUTPATIENT
Start: 2023-03-23

## 2023-04-24 ENCOUNTER — TELEPHONE (OUTPATIENT)
Dept: FAMILY MEDICINE CLINIC | Age: 63
End: 2023-04-24

## 2023-04-24 NOTE — TELEPHONE ENCOUNTER
Pt called stating he saw the latest FDA/CDC guidelines about a 2nd Covid booster for the immunosupressed population. Pt sts has eyad trying to get the booster at [facility] but pt sts was informed that they have not received the latest guidelines. Pt is asking for advice on what to do. Pt requests a call back.

## 2023-04-26 NOTE — TELEPHONE ENCOUNTER
Yes, 2nd bivalent vaccine was just authorized. Pharmacy should be able to give him one if he's interested.   Covid isn't spiking now so I don't think it's urgent

## 2023-05-02 ENCOUNTER — PATIENT MESSAGE (OUTPATIENT)
Dept: FAMILY MEDICINE CLINIC | Age: 63
End: 2023-05-02

## 2023-05-02 NOTE — TELEPHONE ENCOUNTER
Medication:   Requested Prescriptions     Pending Prescriptions Disp Refills    scopolamine (TRANSDERM-SCOP, 1.5 MG,) transdermal patch 4 patch 0     Sig: Place 1 patch onto the skin every 72 hours        Last Filled: 6/4/2018   Last appt: 3/7/2023   Next appt: none

## 2023-05-02 NOTE — TELEPHONE ENCOUNTER
From: Ady Fox  To: Dr. Julio Cesar Kidd  Sent: 5/2/2023 2:49 PM EDT  Subject: Medication for Simba Keke    Dr. Sonu Salcido,    Can you please write a prescription for the anti-sea sickness/motion sickness patch behind the ear, that would last for a period of 12 days.  Thank you    Adalberto James

## 2023-05-03 RX ORDER — SCOLOPAMINE TRANSDERMAL SYSTEM 1 MG/1
1 PATCH, EXTENDED RELEASE TRANSDERMAL
Qty: 4 PATCH | Refills: 0 | Status: SHIPPED | OUTPATIENT
Start: 2023-05-03 | End: 2024-05-02

## 2023-06-06 ENCOUNTER — PATIENT MESSAGE (OUTPATIENT)
Dept: FAMILY MEDICINE CLINIC | Age: 63
End: 2023-06-06

## 2023-06-06 LAB
ALBUMIN SERPL-MCNC: 4.6 G/DL (ref 3.5–5.2)
ALP BLD-CCNC: 65 U/L (ref 53–128)
ALT SERPL-CCNC: 22 U/L (ref 0–45)
AST SERPL-CCNC: 16 U/L (ref 5–34)
BILIRUB SERPL-MCNC: 0.3 MG/DL (ref 0.1–1.2)
BUN / CREAT RATIO: 23.3 RATIO (ref 9–28)
BUN BLDV-MCNC: 21 MG/DL (ref 6–20)
CALCIUM SERPL-MCNC: 9.9 MG/DL (ref 8.6–10.3)
CHLORIDE BLD-SCNC: 103 MMOL/L (ref 97–107)
CO2: 27 MMOL/L (ref 21–31)
CREAT SERPL-MCNC: 0.9 MG/DL (ref 0.7–1.2)
GLUCOSE: 133 MG/DL (ref 74–106)
HCT VFR BLD CALC: 41.9 % (ref 40–51)
HEMOGLOBIN: 14.4 G/DL (ref 13.7–17.5)
MCH RBC QN AUTO: 30.7 PG (ref 26–34)
MCHC RBC AUTO-ENTMCNC: 34.5 G/DL (ref 30.7–35.5)
MCV RBC AUTO: 89 FL (ref 80–100)
PDW BLD-RTO: 12.8 % (ref 11.5–14.5)
PLATELETS: 142 10*3/UL (ref 155–369)
PMV BLD AUTO: 9.9 FL (ref 8.8–12.5)
POTASSIUM SERPL-SCNC: 4.5 MMOL/L (ref 3.6–5)
PSA, TOTAL: <0.01 NG/ML (ref 0–4)
RBC: 4.7 10*6/UL (ref 4.6–6.1)
SODIUM BLD-SCNC: 139 MMOL/L (ref 135–145)
TESTOSTERONE: <10 NG/DL (ref 175–781)
TOTAL PROTEIN: 6.9 G/DL (ref 6.4–8.3)
WBC: 4 10*3/UL (ref 3.7–10.3)

## 2023-06-08 RX ORDER — METFORMIN HYDROCHLORIDE 500 MG/1
1000 TABLET, EXTENDED RELEASE ORAL
Qty: 60 TABLET | Refills: 5 | Status: SHIPPED | OUTPATIENT
Start: 2023-06-08 | End: 2023-06-08

## 2023-06-08 RX ORDER — METFORMIN HYDROCHLORIDE 500 MG/1
1000 TABLET, EXTENDED RELEASE ORAL
Qty: 180 TABLET | Refills: 1 | Status: SHIPPED | OUTPATIENT
Start: 2023-06-08

## 2023-06-08 NOTE — TELEPHONE ENCOUNTER
Ruben Cruz 6/8/2023 7:37 AM EDT      ----- Message -----  From: Rajinder Han  Sent: 6/8/2023 6:32 AM EDT  To: [facility]  Subject: High Glucose Level 133     Dr. Keya Golden,    Thank you, please write a prescription for metformin.      Edy Patel

## 2023-06-26 DIAGNOSIS — E78.2 MIXED HYPERLIPIDEMIA: ICD-10-CM

## 2023-06-26 LAB
CHOLEST SERPL-MCNC: 119 MG/DL (ref 0–199)
HDLC SERPL-MCNC: 43 MG/DL (ref 40–60)
LDLC SERPL CALC-MCNC: 55 MG/DL
TRIGL SERPL-MCNC: 104 MG/DL (ref 0–150)
VLDLC SERPL CALC-MCNC: 21 MG/DL

## 2023-08-15 LAB
ALBUMIN SERPL-MCNC: 4.5 G/DL (ref 3.5–5.2)
ALP BLD-CCNC: 63 U/L (ref 53–128)
ALT SERPL-CCNC: 24 U/L (ref 0–45)
AST SERPL-CCNC: 19 U/L (ref 5–34)
BILIRUB SERPL-MCNC: 0.5 MG/DL (ref 0.1–1.2)
BUN / CREAT RATIO: 21.3 RATIO (ref 9–28)
BUN BLDV-MCNC: 17 MG/DL (ref 6–20)
CALCIUM SERPL-MCNC: 10.2 MG/DL (ref 8.6–10.3)
CHLORIDE BLD-SCNC: 101 MMOL/L (ref 97–107)
CO2: 27 MMOL/L (ref 21–31)
CREAT SERPL-MCNC: 0.8 MG/DL (ref 0.7–1.2)
GLUCOSE: 86 MG/DL (ref 74–106)
HCT VFR BLD CALC: 42.7 % (ref 40–51)
HEMOGLOBIN: 14.6 G/DL (ref 13.7–17.5)
MCH RBC QN AUTO: 30.2 PG (ref 26–34)
MCHC RBC AUTO-ENTMCNC: 34.1 G/DL (ref 30.7–35.5)
MCV RBC AUTO: 88 FL (ref 80–100)
PDW BLD-RTO: 13.4 % (ref 11.5–14.5)
PLATELETS: 158 10*3/UL (ref 155–369)
PMV BLD AUTO: 9.4 FL (ref 8.8–12.5)
POTASSIUM SERPL-SCNC: 4.3 MMOL/L (ref 3.6–5)
RBC: 4.83 10*6/UL (ref 4.6–6.1)
SODIUM BLD-SCNC: 136 MMOL/L (ref 135–145)
TOTAL PROTEIN: 6.8 G/DL (ref 6.4–8.3)
WBC: 4.6 10*3/UL (ref 3.7–10.3)

## 2023-11-22 LAB
ALBUMIN SERPL-MCNC: 4.7 G/DL (ref 3.5–5.2)
ALP BLD-CCNC: 60 U/L (ref 53–128)
ALT SERPL-CCNC: 26 U/L (ref 0–45)
AST SERPL-CCNC: 20 U/L (ref 5–34)
BILIRUB SERPL-MCNC: 0.4 MG/DL (ref 0.1–1.2)
BUN / CREAT RATIO: 17.8 RATIO (ref 9–28)
BUN BLDV-MCNC: 16 MG/DL (ref 6–20)
CALCIUM SERPL-MCNC: 10.1 MG/DL (ref 8.6–10.3)
CHLORIDE BLD-SCNC: 105 MMOL/L (ref 97–107)
CO2: 24 MMOL/L (ref 21–31)
CREAT SERPL-MCNC: 0.9 MG/DL (ref 0.7–1.2)
GLUCOSE: 88 MG/DL (ref 74–106)
HCT VFR BLD CALC: 42.8 % (ref 40–51)
HEMOGLOBIN: 14.5 G/DL (ref 13.7–17.5)
MCH RBC QN AUTO: 30.1 PG (ref 26–34)
MCHC RBC AUTO-ENTMCNC: 33.9 G/DL (ref 30.7–35.5)
MCV RBC AUTO: 89 FL (ref 80–100)
PDW BLD-RTO: 13.5 % (ref 11.5–14.5)
PLATELETS: 163 10*3/UL (ref 155–369)
PMV BLD AUTO: 9.6 FL (ref 8.8–12.5)
POTASSIUM SERPL-SCNC: 4.3 MMOL/L (ref 3.6–5)
RBC: 4.82 10*6/UL (ref 4.6–6.1)
SODIUM BLD-SCNC: 136 MMOL/L (ref 135–145)
TOTAL PROTEIN: 6.6 G/DL (ref 6.4–8.3)
WBC: 4.8 10*3/UL (ref 3.7–10.3)

## 2023-11-30 RX ORDER — METFORMIN HYDROCHLORIDE 500 MG/1
1000 TABLET, EXTENDED RELEASE ORAL
Qty: 180 TABLET | Refills: 3 | Status: SHIPPED | OUTPATIENT
Start: 2023-11-30

## 2023-12-13 NOTE — TELEPHONE ENCOUNTER
Medication:   Requested Prescriptions     Pending Prescriptions Disp Refills    rosuvastatin (CRESTOR) 10 MG tablet 90 tablet 3     Sig: Take 1 tablet by mouth daily        Last Filled:  3/23/23    Patient Phone Number: [phone] (home) [phone] (work)    Last appt: 3/7/2023   Next appt: Visit date not found    Last OARRS:        No data to display

## 2023-12-14 RX ORDER — ROSUVASTATIN CALCIUM 10 MG/1
10 TABLET, COATED ORAL DAILY
Qty: 90 TABLET | Refills: 3 | Status: SHIPPED | OUTPATIENT
Start: 2023-12-14

## 2024-03-05 ASSESSMENT — PATIENT HEALTH QUESTIONNAIRE - PHQ9
1. LITTLE INTEREST OR PLEASURE IN DOING THINGS: 0
SUM OF ALL RESPONSES TO PHQ QUESTIONS 1-9: 0
SUM OF ALL RESPONSES TO PHQ QUESTIONS 1-9: 0
1. LITTLE INTEREST OR PLEASURE IN DOING THINGS: NOT AT ALL
2. FEELING DOWN, DEPRESSED OR HOPELESS: NOT AT ALL
2. FEELING DOWN, DEPRESSED OR HOPELESS: 0
SUM OF ALL RESPONSES TO PHQ9 QUESTIONS 1 & 2: 0
SUM OF ALL RESPONSES TO PHQ QUESTIONS 1-9: 0
SUM OF ALL RESPONSES TO PHQ9 QUESTIONS 1 & 2: 0
SUM OF ALL RESPONSES TO PHQ QUESTIONS 1-9: 0

## 2024-03-08 ENCOUNTER — OFFICE VISIT (OUTPATIENT)
Dept: FAMILY MEDICINE CLINIC | Age: 64
End: 2024-03-08
Payer: COMMERCIAL

## 2024-03-08 VITALS
HEART RATE: 85 BPM | HEIGHT: 73 IN | SYSTOLIC BLOOD PRESSURE: 128 MMHG | OXYGEN SATURATION: 96 % | BODY MASS INDEX: 30.35 KG/M2 | DIASTOLIC BLOOD PRESSURE: 72 MMHG | WEIGHT: 229 LBS

## 2024-03-08 DIAGNOSIS — Z00.00 WELL ADULT EXAM: Primary | ICD-10-CM

## 2024-03-08 DIAGNOSIS — R93.1 ELEVATED CORONARY ARTERY CALCIUM SCORE: ICD-10-CM

## 2024-03-08 PROCEDURE — G8484 FLU IMMUNIZE NO ADMIN: HCPCS | Performed by: FAMILY MEDICINE

## 2024-03-08 PROCEDURE — 99396 PREV VISIT EST AGE 40-64: CPT | Performed by: FAMILY MEDICINE

## 2024-03-08 SDOH — ECONOMIC STABILITY: INCOME INSECURITY: HOW HARD IS IT FOR YOU TO PAY FOR THE VERY BASICS LIKE FOOD, HOUSING, MEDICAL CARE, AND HEATING?: NOT HARD AT ALL

## 2024-03-08 SDOH — ECONOMIC STABILITY: FOOD INSECURITY: WITHIN THE PAST 12 MONTHS, YOU WORRIED THAT YOUR FOOD WOULD RUN OUT BEFORE YOU GOT MONEY TO BUY MORE.: NEVER TRUE

## 2024-03-08 SDOH — ECONOMIC STABILITY: FOOD INSECURITY: WITHIN THE PAST 12 MONTHS, THE FOOD YOU BOUGHT JUST DIDN'T LAST AND YOU DIDN'T HAVE MONEY TO GET MORE.: NEVER TRUE

## 2024-03-08 ASSESSMENT — ENCOUNTER SYMPTOMS: RESPIRATORY NEGATIVE: 1

## 2024-03-08 NOTE — PROGRESS NOTES
Ramon Gspandl (:  1960) is a 63 y.o. male,Established patient, here for evaluation of the following chief complaint(s):  Annual Exam         ASSESSMENT/PLAN:  Ramon was seen today for annual exam.    Diagnoses and all orders for this visit:    Well adult exam  -     Lipid Panel; Future  -     Comprehensive Metabolic Panel; Future    Reviewed diet and exercise  Follows with urology for h/o prostate cancer.     No follow-ups on file.         Subjective   SUBJECTIVE/OBJECTIVE:  HPI  Pt is a of 63 y.o. male comes in today with   Chief Complaint   Patient presents with    Annual Exam     Has eyad feeling well.      Vitals:    24 1411   BP: 128/72   Pulse: 85   SpO2: 96%   Weight: 103.9 kg (229 lb)   Height: 1.854 m (6' 1\")     Review of Systems   Constitutional: Negative.    Respiratory: Negative.     Cardiovascular: Negative.           Objective   Physical Exam  Constitutional:       General: He is not in acute distress.     Appearance: Normal appearance. He is well-developed. He is not diaphoretic.   HENT:      Head: Normocephalic and atraumatic.   Eyes:      General: No scleral icterus.  Abdominal:      General: There is no distension.   Musculoskeletal:      Lumbar back: Spasms and bony tenderness present. Decreased range of motion.   Skin:     General: Skin is warm and dry.      Capillary Refill: Capillary refill takes less than 2 seconds.   Neurological:      General: No focal deficit present.      Mental Status: He is alert.      Cranial Nerves: No cranial nerve deficit.      Sensory: No sensory deficit.      Deep Tendon Reflexes:      Reflex Scores:       Patellar reflexes are 2+ on the right side and 2+ on the left side.       Achilles reflexes are 2+ on the right side and 2+ on the left side.  Psychiatric:         Behavior: Behavior normal.         Thought Content: Thought content normal.         Judgment: Judgment normal.              An electronic signature was used to authenticate this

## 2024-03-13 DIAGNOSIS — Z00.00 WELL ADULT EXAM: ICD-10-CM

## 2024-03-13 DIAGNOSIS — R93.1 ELEVATED CORONARY ARTERY CALCIUM SCORE: ICD-10-CM

## 2024-03-13 LAB
ALBUMIN SERPL-MCNC: 4.3 G/DL (ref 3.4–5)
ALBUMIN/GLOB SERPL: 1.8 {RATIO} (ref 1.1–2.2)
ALP SERPL-CCNC: 67 U/L (ref 40–129)
ALT SERPL-CCNC: 21 U/L (ref 10–40)
ANION GAP SERPL CALCULATED.3IONS-SCNC: 7 MMOL/L (ref 3–16)
AST SERPL-CCNC: 16 U/L (ref 15–37)
BILIRUB SERPL-MCNC: 0.4 MG/DL (ref 0–1)
BUN SERPL-MCNC: 17 MG/DL (ref 7–20)
CALCIUM SERPL-MCNC: 9.7 MG/DL (ref 8.3–10.6)
CHLORIDE SERPL-SCNC: 103 MMOL/L (ref 99–110)
CHOLEST SERPL-MCNC: 145 MG/DL (ref 0–199)
CO2 SERPL-SCNC: 29 MMOL/L (ref 21–32)
CREAT SERPL-MCNC: 0.9 MG/DL (ref 0.8–1.3)
CRP SERPL HS-MCNC: 0.65 MG/L (ref 0.16–3)
GFR SERPLBLD CREATININE-BSD FMLA CKD-EPI: >60 ML/MIN/{1.73_M2}
GLUCOSE SERPL-MCNC: 99 MG/DL (ref 70–99)
HDLC SERPL-MCNC: 38 MG/DL (ref 40–60)
LDLC SERPL CALC-MCNC: 70 MG/DL
POTASSIUM SERPL-SCNC: 4.4 MMOL/L (ref 3.5–5.1)
PROT SERPL-MCNC: 6.7 G/DL (ref 6.4–8.2)
SODIUM SERPL-SCNC: 139 MMOL/L (ref 136–145)
TRIGL SERPL-MCNC: 184 MG/DL (ref 0–150)
VLDLC SERPL CALC-MCNC: 37 MG/DL

## 2024-10-09 RX ORDER — METFORMIN HCL 500 MG
1000 TABLET, EXTENDED RELEASE 24 HR ORAL
Qty: 180 TABLET | Refills: 3 | Status: SHIPPED | OUTPATIENT
Start: 2024-10-09

## 2024-10-09 NOTE — TELEPHONE ENCOUNTER
Medication:   Requested Prescriptions     Pending Prescriptions Disp Refills    metFORMIN (GLUCOPHAGE-XR) 500 MG extended release tablet [Pharmacy Med Name: metFORMIN HCl  MG Oral Tablet Extended Release 24 Hour] 180 tablet 3     Sig: TAKE 2 TABLETS BY MOUTH DAILY  WITH BREAKFAST       Last Filled:  11/30/23    Patient Phone Number: [phone] (home) [phone] (work)    Last appt: 3/8/2024   Next appt: Visit date not found    Last Labs DM: No results found for: \"LABA1C\"  Last Lipid:   Lab Results   Component Value Date/Time    CHOL 145 03/13/2024 08:27 AM    TRIG 184 03/13/2024 08:27 AM    HDL 38 03/13/2024 08:27 AM     Last PSA:   Lab Results   Component Value Date/Time    PSA <0.01 08/13/2024 02:38 PM    PSA <0.01 06/05/2023 01:10 PM     Last Thyroid:   Lab Results   Component Value Date/Time    TSH 1.92 05/26/2017 09:45 AM

## 2025-01-02 NOTE — TELEPHONE ENCOUNTER
Medication:   Requested Prescriptions     Pending Prescriptions Disp Refills    rosuvastatin (CRESTOR) 10 MG tablet [Pharmacy Med Name: Rosuvastatin Calcium 10 MG Oral Tablet] 90 tablet 3     Sig: TAKE 1 TABLET BY MOUTH DAILY       Last Filled:  12/14/23    Patient Phone Number: [phone] (home) [phone] (work)    Last appt: 3/8/2024   Next appt: Visit date not found    Last Labs DM: No results found for: \"LABA1C\"  Last Lipid:   Lab Results   Component Value Date/Time    CHOL 145 03/13/2024 08:27 AM    TRIG 184 03/13/2024 08:27 AM    HDL 38 03/13/2024 08:27 AM     Last PSA:   Lab Results   Component Value Date/Time    PSA <0.01 08/13/2024 02:38 PM    PSA <0.01 06/05/2023 01:10 PM     Last Thyroid:   Lab Results   Component Value Date/Time    TSH 1.92 05/26/2017 09:45 AM

## 2025-01-03 RX ORDER — ROSUVASTATIN CALCIUM 10 MG/1
10 TABLET, COATED ORAL DAILY
Qty: 90 TABLET | Refills: 3 | Status: SHIPPED | OUTPATIENT
Start: 2025-01-03

## 2025-05-15 ASSESSMENT — PATIENT HEALTH QUESTIONNAIRE - PHQ9
2. FEELING DOWN, DEPRESSED OR HOPELESS: NOT AT ALL
SUM OF ALL RESPONSES TO PHQ9 QUESTIONS 1 & 2: 0
1. LITTLE INTEREST OR PLEASURE IN DOING THINGS: NOT AT ALL
SUM OF ALL RESPONSES TO PHQ QUESTIONS 1-9: 0
1. LITTLE INTEREST OR PLEASURE IN DOING THINGS: NOT AT ALL
SUM OF ALL RESPONSES TO PHQ QUESTIONS 1-9: 0
2. FEELING DOWN, DEPRESSED OR HOPELESS: NOT AT ALL

## 2025-05-16 ENCOUNTER — OFFICE VISIT (OUTPATIENT)
Dept: FAMILY MEDICINE CLINIC | Age: 65
End: 2025-05-16
Payer: COMMERCIAL

## 2025-05-16 VITALS
SYSTOLIC BLOOD PRESSURE: 110 MMHG | HEART RATE: 74 BPM | DIASTOLIC BLOOD PRESSURE: 80 MMHG | OXYGEN SATURATION: 95 % | WEIGHT: 236 LBS | HEIGHT: 73 IN | TEMPERATURE: 98.3 F | BODY MASS INDEX: 31.28 KG/M2

## 2025-05-16 DIAGNOSIS — E78.2 MIXED HYPERLIPIDEMIA: ICD-10-CM

## 2025-05-16 DIAGNOSIS — Z00.00 WELL ADULT EXAM: ICD-10-CM

## 2025-05-16 DIAGNOSIS — R93.1 ELEVATED CORONARY ARTERY CALCIUM SCORE: ICD-10-CM

## 2025-05-16 DIAGNOSIS — Z00.00 WELL ADULT EXAM: Primary | ICD-10-CM

## 2025-05-16 DIAGNOSIS — R73.01 ELEVATED FASTING BLOOD SUGAR: ICD-10-CM

## 2025-05-16 LAB
CHOLEST SERPL-MCNC: 129 MG/DL (ref 0–199)
CRP SERPL HS-MCNC: 15.5 MG/L (ref 0.16–3)
EST. AVERAGE GLUCOSE BLD GHB EST-MCNC: 102.5 MG/DL
HBA1C MFR BLD: 5.2 %
HDLC SERPL-MCNC: 38 MG/DL (ref 40–60)
LDLC SERPL CALC-MCNC: 72 MG/DL
TRIGL SERPL-MCNC: 94 MG/DL (ref 0–150)
VLDLC SERPL CALC-MCNC: 19 MG/DL

## 2025-05-16 PROCEDURE — 99397 PER PM REEVAL EST PAT 65+ YR: CPT | Performed by: FAMILY MEDICINE

## 2025-05-16 SDOH — ECONOMIC STABILITY: FOOD INSECURITY: WITHIN THE PAST 12 MONTHS, YOU WORRIED THAT YOUR FOOD WOULD RUN OUT BEFORE YOU GOT MONEY TO BUY MORE.: NEVER TRUE

## 2025-05-16 SDOH — ECONOMIC STABILITY: FOOD INSECURITY: WITHIN THE PAST 12 MONTHS, THE FOOD YOU BOUGHT JUST DIDN'T LAST AND YOU DIDN'T HAVE MONEY TO GET MORE.: NEVER TRUE

## 2025-05-16 NOTE — PROGRESS NOTES
Ramon Gspandl (:  1960) is a 64 y.o. male,Established patient, here for evaluation of the following chief complaint(s):  Annual Exam      Assessment & Plan   ASSESSMENT/PLAN:  Ramon was seen today for annual exam.    Diagnoses and all orders for this visit:    Well adult exam  -     Lipid Panel; Future  -     High sensitivity CRP; Future  Will be working on diet since wt is up.  Mixed hyperlipidemia  -     Lipid Panel; Future  -     High sensitivity CRP; Future    Elevated coronary artery calcium score  -     Lipid Panel; Future  -     High sensitivity CRP; Future  Saw cardiology. Now on statin  Consider repeating next year             No follow-ups on file.         Subjective   SUBJECTIVE/OBJECTIVE:  HPI  Pt is a of 64 y.o. male comes in today with   Chief Complaint   Patient presents with    Annual Exam     Diet has been good.  Just had psa which was undetectable. testosterone up to 280.  Some blood in stool. Last colonoscopy . Normally every 5 years.    Vitals:    25 0747   BP: 110/80   BP Site: Left Upper Arm   Patient Position: Sitting   BP Cuff Size: Large Adult   Pulse: 74   Temp: 98.3 °F (36.8 °C)   TempSrc: Temporal   SpO2: 95%   Weight: 107 kg (236 lb)   Height: 1.854 m (6' 1\")       Past Medical History:Reviewed  Medications:Reviewed.  Allergies   Allergen Reactions    Kiwi Extract Anaphylaxis    Other/Food Anaphylaxis     BEER    Wine [Alcohol] Anaphylaxis and Itching     Beverages      Social hx:Reviewed.  Social History     Tobacco Use   Smoking Status Never   Smokeless Tobacco Never        Review of Systems       Objective   Physical Exam  Constitutional:       Appearance: Normal appearance. He is well-developed.   HENT:      Head: Normocephalic and atraumatic.      Mouth/Throat:      Pharynx: Oropharynx is clear.   Eyes:      General: No scleral icterus.     Conjunctiva/sclera: Conjunctivae normal.   Neck:      Thyroid: No thyromegaly.   Cardiovascular:      Rate and Rhythm:

## 2025-05-18 ENCOUNTER — PATIENT MESSAGE (OUTPATIENT)
Dept: FAMILY MEDICINE CLINIC | Age: 65
End: 2025-05-18

## 2025-05-18 DIAGNOSIS — R93.1 ELEVATED CORONARY ARTERY CALCIUM SCORE: ICD-10-CM

## 2025-05-18 DIAGNOSIS — R79.82 ELEVATED C-REACTIVE PROTEIN (CRP): Primary | ICD-10-CM

## 2025-05-27 ENCOUNTER — APPOINTMENT (OUTPATIENT)
Dept: URBAN - METROPOLITAN AREA CLINIC 170 | Facility: CLINIC | Age: 65
Setting detail: DERMATOLOGY
End: 2025-05-27

## 2025-05-27 DIAGNOSIS — L81.4 OTHER MELANIN HYPERPIGMENTATION: ICD-10-CM | Status: STABLE

## 2025-05-27 DIAGNOSIS — D22 MELANOCYTIC NEVI: ICD-10-CM | Status: STABLE

## 2025-05-27 DIAGNOSIS — D18.0 HEMANGIOMA: ICD-10-CM | Status: STABLE

## 2025-05-27 DIAGNOSIS — L82.1 OTHER SEBORRHEIC KERATOSIS: ICD-10-CM | Status: STABLE

## 2025-05-27 PROBLEM — D48.5 NEOPLASM OF UNCERTAIN BEHAVIOR OF SKIN: Status: ACTIVE | Noted: 2025-05-27

## 2025-05-27 PROBLEM — D18.01 HEMANGIOMA OF SKIN AND SUBCUTANEOUS TISSUE: Status: ACTIVE | Noted: 2025-05-27

## 2025-05-27 PROBLEM — D22.5 MELANOCYTIC NEVI OF TRUNK: Status: ACTIVE | Noted: 2025-05-27

## 2025-05-27 PROCEDURE — ? BIOPSY BY SHAVE METHOD

## 2025-05-27 PROCEDURE — ? FULL BODY SKIN EXAM

## 2025-05-27 PROCEDURE — ? COUNSELING

## 2025-05-27 PROCEDURE — ? TREATMENT REGIMEN

## 2025-05-27 PROCEDURE — 99203 OFFICE O/P NEW LOW 30 MIN: CPT | Mod: 25

## 2025-05-27 PROCEDURE — 11102 TANGNTL BX SKIN SINGLE LES: CPT

## 2025-05-27 ASSESSMENT — LOCATION DETAILED DESCRIPTION DERM
LOCATION DETAILED: RIGHT DISTAL DORSAL FOREARM
LOCATION DETAILED: RIGHT MEDIAL SUPERIOR CHEST
LOCATION DETAILED: RIGHT MEDIAL INFERIOR CHEST
LOCATION DETAILED: LEFT SUPERIOR UPPER BACK
LOCATION DETAILED: RIGHT SUPERIOR UPPER BACK
LOCATION DETAILED: MIDDLE STERNUM
LOCATION DETAILED: PERIUMBILICAL SKIN
LOCATION DETAILED: UPPER STERNUM
LOCATION DETAILED: LEFT PROXIMAL DORSAL FOREARM
LOCATION DETAILED: RIGHT MID-UPPER BACK

## 2025-05-27 ASSESSMENT — LOCATION ZONE DERM
LOCATION ZONE: ARM
LOCATION ZONE: TRUNK

## 2025-05-27 ASSESSMENT — LOCATION SIMPLE DESCRIPTION DERM
LOCATION SIMPLE: CHEST
LOCATION SIMPLE: LEFT UPPER BACK
LOCATION SIMPLE: RIGHT FOREARM
LOCATION SIMPLE: ABDOMEN
LOCATION SIMPLE: LEFT FOREARM
LOCATION SIMPLE: RIGHT UPPER BACK

## 2025-08-05 RX ORDER — METFORMIN HYDROCHLORIDE 500 MG/1
1000 TABLET, EXTENDED RELEASE ORAL
Qty: 180 TABLET | Refills: 3 | Status: SHIPPED | OUTPATIENT
Start: 2025-08-05